# Patient Record
Sex: MALE | Race: WHITE | Employment: FULL TIME
[De-identification: names, ages, dates, MRNs, and addresses within clinical notes are randomized per-mention and may not be internally consistent; named-entity substitution may affect disease eponyms.]

---

## 2022-10-06 ENCOUNTER — OFFICE VISIT (OUTPATIENT)
Dept: INTERNAL MEDICINE CLINIC | Facility: CLINIC | Age: 22
End: 2022-10-06
Payer: COMMERCIAL

## 2022-10-06 VITALS
DIASTOLIC BLOOD PRESSURE: 67 MMHG | HEART RATE: 70 BPM | BODY MASS INDEX: 24.11 KG/M2 | OXYGEN SATURATION: 99 % | TEMPERATURE: 99.2 F | SYSTOLIC BLOOD PRESSURE: 107 MMHG | WEIGHT: 168.4 LBS | HEIGHT: 70 IN | RESPIRATION RATE: 15 BRPM

## 2022-10-06 DIAGNOSIS — M15.9 OSTEOARTHRITIS OF MULTIPLE JOINTS, UNSPECIFIED OSTEOARTHRITIS TYPE: ICD-10-CM

## 2022-10-06 DIAGNOSIS — Z76.89 ENCOUNTER TO ESTABLISH CARE: Primary | ICD-10-CM

## 2022-10-06 PROCEDURE — G8420 CALC BMI NORM PARAMETERS: HCPCS | Performed by: NURSE PRACTITIONER

## 2022-10-06 PROCEDURE — 99203 OFFICE O/P NEW LOW 30 MIN: CPT | Performed by: NURSE PRACTITIONER

## 2022-10-06 PROCEDURE — G8484 FLU IMMUNIZE NO ADMIN: HCPCS | Performed by: NURSE PRACTITIONER

## 2022-10-06 PROCEDURE — 1036F TOBACCO NON-USER: CPT | Performed by: NURSE PRACTITIONER

## 2022-10-06 PROCEDURE — G8427 DOCREV CUR MEDS BY ELIG CLIN: HCPCS | Performed by: NURSE PRACTITIONER

## 2022-10-06 ASSESSMENT — PATIENT HEALTH QUESTIONNAIRE - PHQ9
1. LITTLE INTEREST OR PLEASURE IN DOING THINGS: 0
2. FEELING DOWN, DEPRESSED OR HOPELESS: 0
SUM OF ALL RESPONSES TO PHQ QUESTIONS 1-9: 0
SUM OF ALL RESPONSES TO PHQ9 QUESTIONS 1 & 2: 0
SUM OF ALL RESPONSES TO PHQ QUESTIONS 1-9: 0

## 2022-10-06 ASSESSMENT — ANXIETY QUESTIONNAIRES
5. BEING SO RESTLESS THAT IT IS HARD TO SIT STILL: 0
7. FEELING AFRAID AS IF SOMETHING AWFUL MIGHT HAPPEN: 0
4. TROUBLE RELAXING: 0
2. NOT BEING ABLE TO STOP OR CONTROL WORRYING: 0
6. BECOMING EASILY ANNOYED OR IRRITABLE: 0
1. FEELING NERVOUS, ANXIOUS, OR ON EDGE: 0
3. WORRYING TOO MUCH ABOUT DIFFERENT THINGS: 0
GAD7 TOTAL SCORE: 0

## 2022-10-06 ASSESSMENT — ENCOUNTER SYMPTOMS
SORE THROAT: 0
NAUSEA: 0
SINUS PAIN: 0
EYE PAIN: 0
COUGH: 0
VOMITING: 0
ABDOMINAL PAIN: 0
CONSTIPATION: 0
DIARRHEA: 0
SHORTNESS OF BREATH: 0
RHINORRHEA: 0
BACK PAIN: 0

## 2022-10-06 NOTE — PROGRESS NOTES
[address]  Tel# [phone]  Fax# [phone]       Marycruz SaxenaLiang andersons, [facility]-BC  Family Nurse Practitioner            Date of Visit: 10/06/2022     Shanell Delvalle (: 2000) is a 23 y.o. male  new patient, here for evaluation of the following chief complaint(s):    Chief Complaint   Patient presents with    New Patient     Establish care         Patient Care Team:  MARINA aCntrell CNP as PCP - General (Nurse Practitioner Family)         History of Present Illness      New Patient  Presents here as a new patient and wants to establish care. Previous PCP at [facility]. Recently got his own medical insurance. Chronic Left Knee Arthritis  From tennis and other sports (started at age 8)  Had MRI of his left knee about a year ago  Per orthopedic, Dr. Adarsh Cervantes, was discussed possible surgery in 10 years ([facility])  Aggravated by running or playing sports  Has been doing more rock climbing  Takes OTC ibuprofen or topical as needed. Patient Active Problem List   Diagnosis    Osteoarthritis of multiple joints         Past Medical History:   Diagnosis Date    Elbow tendinitis     Foot fracture, right     5th metatarsal at age 13, stress fracture from cross country running    Osteoarthritis        History reviewed. No pertinent surgical history. Family History   Problem Relation Age of Onset    High Blood Pressure Father     Hearing Loss Father     Depression Sister     Cancer Maternal Grandmother     Breast Cancer Maternal Grandmother     Mental Illness Paternal Grandfather          ALLERGY  No Known Allergies        No current outpatient medications on file prior to visit. No current facility-administered medications on file prior to visit. Review of Systems  Review of Systems   Constitutional:  Negative for chills, fatigue and fever.    HENT:  Negative for congestion, postnasal drip, rhinorrhea, sinus pain, sneezing Thought Content: Thought content normal.              Assessment/Plan:          ICD-10-CM    1. Encounter to establish care  Z76.89       2. Osteoarthritis of multiple joints, unspecified osteoarthritis type  M15.9     Avoid or limit aggravating factors. May take OTC ibuprofen. Report any falls or injury. Manuela Moss was seen today for new patient. Diagnoses and all orders for this visit:    Encounter to establish care    Osteoarthritis of multiple joints, unspecified osteoarthritis type  Comments:  Avoid or limit aggravating factors. May take OTC ibuprofen. Report any falls or injury. Follow Up  Return in about 2 weeks (around 10/20/2022), or if symptoms worsen or fail to improve, for Physical with labs.              Keke Bray, BEATRIZ, FNP-BC

## 2022-10-21 ENCOUNTER — OFFICE VISIT (OUTPATIENT)
Dept: INTERNAL MEDICINE CLINIC | Facility: CLINIC | Age: 22
End: 2022-10-21
Payer: COMMERCIAL

## 2022-10-21 VITALS
HEART RATE: 57 BPM | DIASTOLIC BLOOD PRESSURE: 69 MMHG | OXYGEN SATURATION: 99 % | RESPIRATION RATE: 15 BRPM | WEIGHT: 170.2 LBS | HEIGHT: 69 IN | SYSTOLIC BLOOD PRESSURE: 116 MMHG | BODY MASS INDEX: 25.21 KG/M2 | TEMPERATURE: 98.2 F

## 2022-10-21 DIAGNOSIS — Z00.00 ROUTINE GENERAL MEDICAL EXAMINATION AT A HEALTH CARE FACILITY: Primary | ICD-10-CM

## 2022-10-21 DIAGNOSIS — J30.9 ALLERGIC RHINITIS, UNSPECIFIED SEASONALITY, UNSPECIFIED TRIGGER: ICD-10-CM

## 2022-10-21 DIAGNOSIS — Z00.00 ROUTINE GENERAL MEDICAL EXAMINATION AT A HEALTH CARE FACILITY: ICD-10-CM

## 2022-10-21 LAB
APPEARANCE UR: CLEAR
BASOPHILS # BLD: 0 K/UL (ref 0–0.2)
BASOPHILS NFR BLD: 1 % (ref 0–2)
BILIRUB UR QL: NEGATIVE
COLOR UR: NORMAL
DIFFERENTIAL METHOD BLD: NORMAL
EOSINOPHIL # BLD: 0.1 K/UL (ref 0–0.8)
EOSINOPHIL NFR BLD: 2 % (ref 0.5–7.8)
ERYTHROCYTE [DISTWIDTH] IN BLOOD BY AUTOMATED COUNT: 12.9 % (ref 11.9–14.6)
GLUCOSE UR STRIP.AUTO-MCNC: NEGATIVE MG/DL
HCT VFR BLD AUTO: 45.9 % (ref 41.1–50.3)
HGB BLD-MCNC: 14.6 G/DL (ref 13.6–17.2)
HGB UR QL STRIP: NEGATIVE
IMM GRANULOCYTES # BLD AUTO: 0 K/UL (ref 0–0.5)
IMM GRANULOCYTES NFR BLD AUTO: 0 % (ref 0–5)
KETONES UR QL STRIP.AUTO: NEGATIVE MG/DL
LEUKOCYTE ESTERASE UR QL STRIP.AUTO: NEGATIVE
LYMPHOCYTES # BLD: 1.7 K/UL (ref 0.5–4.6)
LYMPHOCYTES NFR BLD: 34 % (ref 13–44)
MCH RBC QN AUTO: 29.3 PG (ref 26.1–32.9)
MCHC RBC AUTO-ENTMCNC: 31.8 G/DL (ref 31.4–35)
MCV RBC AUTO: 92 FL (ref 82–102)
MONOCYTES # BLD: 0.4 K/UL (ref 0.1–1.3)
MONOCYTES NFR BLD: 7 % (ref 4–12)
NEUTS SEG # BLD: 2.8 K/UL (ref 1.7–8.2)
NEUTS SEG NFR BLD: 56 % (ref 43–78)
NITRITE UR QL STRIP.AUTO: NEGATIVE
NRBC # BLD: 0 K/UL (ref 0–0.2)
PH UR STRIP: 5.5 [PH] (ref 5–9)
PLATELET # BLD AUTO: 196 K/UL (ref 150–450)
PMV BLD AUTO: 11.6 FL (ref 9.4–12.3)
PROT UR STRIP-MCNC: NEGATIVE MG/DL
RBC # BLD AUTO: 4.99 M/UL (ref 4.23–5.6)
SP GR UR REFRACTOMETRY: 1.02 (ref 1–1.02)
UROBILINOGEN UR QL STRIP.AUTO: 0.2 EU/DL (ref 0.2–1)
WBC # BLD AUTO: 5 K/UL (ref 4.3–11.1)

## 2022-10-21 PROCEDURE — 99395 PREV VISIT EST AGE 18-39: CPT | Performed by: NURSE PRACTITIONER

## 2022-10-21 PROCEDURE — G8484 FLU IMMUNIZE NO ADMIN: HCPCS | Performed by: NURSE PRACTITIONER

## 2022-10-21 RX ORDER — FLUTICASONE PROPIONATE 50 MCG
1 SPRAY, SUSPENSION (ML) NASAL DAILY PRN
Qty: 16 G | Refills: 2 | Status: SHIPPED | OUTPATIENT
Start: 2022-10-21

## 2022-10-21 ASSESSMENT — ANXIETY QUESTIONNAIRES
7. FEELING AFRAID AS IF SOMETHING AWFUL MIGHT HAPPEN: 0
6. BECOMING EASILY ANNOYED OR IRRITABLE: 0
5. BEING SO RESTLESS THAT IT IS HARD TO SIT STILL: 0
IF YOU CHECKED OFF ANY PROBLEMS ON THIS QUESTIONNAIRE, HOW DIFFICULT HAVE THESE PROBLEMS MADE IT FOR YOU TO DO YOUR WORK, TAKE CARE OF THINGS AT HOME, OR GET ALONG WITH OTHER PEOPLE: NOT DIFFICULT AT ALL
2. NOT BEING ABLE TO STOP OR CONTROL WORRYING: 0
GAD7 TOTAL SCORE: 0
1. FEELING NERVOUS, ANXIOUS, OR ON EDGE: 0
4. TROUBLE RELAXING: 0
3. WORRYING TOO MUCH ABOUT DIFFERENT THINGS: 0

## 2022-10-21 ASSESSMENT — ENCOUNTER SYMPTOMS
CONSTIPATION: 0
SORE THROAT: 0
RHINORRHEA: 0
SHORTNESS OF BREATH: 0
NAUSEA: 0
SINUS PAIN: 0
COUGH: 0
VOMITING: 0
EYE PAIN: 0
BACK PAIN: 0
ABDOMINAL PAIN: 0
DIARRHEA: 0

## 2022-10-21 ASSESSMENT — PATIENT HEALTH QUESTIONNAIRE - PHQ9
2. FEELING DOWN, DEPRESSED OR HOPELESS: 0
SUM OF ALL RESPONSES TO PHQ9 QUESTIONS 1 & 2: 0
SUM OF ALL RESPONSES TO PHQ QUESTIONS 1-9: 0
1. LITTLE INTEREST OR PLEASURE IN DOING THINGS: 0
SUM OF ALL RESPONSES TO PHQ QUESTIONS 1-9: 0

## 2022-10-21 NOTE — PROGRESS NOTES
[facility]  [place] [address]  Tel# [phone]  Fax# [phone]       Rita Stevenson, FN-BC  Family Nurse Practitioner            Date of Visit: 10/21/2022     Mili Hendrickson (: 2000) is a 23 y.o. male  established patient, here for evaluation of the following chief complaint(s):    Chief Complaint   Patient presents with    Annual Exam     physical         Patient Care Team:  MARINA Norris CNP as PCP - General (Nurse Practitioner Family)  MARINA Norris CNP as PCP - [facility] Empaneled Provider         History of Present Illness          Physical   Presents here for physical with labs. Denies any new complaints at this time. HCM    Influenza vaccine  season: not yet    Immunization History   Administered Date(s) Administered    COVID-19, MODERNA BLUE border, Primary or Immunocompromised, (age 12y+), IM, 100 mcg/0.5mL 2021    COVID-19, MODERNA Booster BLUE border, (age 18y+), IM, 50mcg/0.25mL 2021, 2021    Meningococcal MCV4P (Menactra) 2017    Varicella (Varivax) 2016              Patient Active Problem List   Diagnosis    Osteoarthritis of multiple joints       Past Medical History:   Diagnosis Date    Elbow tendinitis     Foot fracture, right     5th metatarsal at age 13, stress fracture from cross country running    Osteoarthritis        History reviewed. No pertinent surgical history. Family History   Problem Relation Age of Onset    High Blood Pressure Father     Hearing Loss Father     Depression Sister     Cancer Maternal Grandmother     Breast Cancer Maternal Grandmother     Mental Illness Paternal Grandfather          ALLERGY  No Known Allergies        No current outpatient medications on file prior to visit. No current facility-administered medications on file prior to visit. Review of Systems  Review of Systems   Constitutional:  Negative for chills, fatigue and fever.    HENT:  Negative for congestion, postnasal drip, rhinorrhea, sinus pain, sneezing and sore throat. Eyes:  Negative for pain and visual disturbance. Respiratory:  Negative for cough and shortness of breath. Cardiovascular:  Negative for chest pain, palpitations and leg swelling. Gastrointestinal:  Negative for abdominal pain, constipation, diarrhea, nausea and vomiting. Genitourinary:  Negative for dysuria, frequency and urgency. Musculoskeletal:  Negative for back pain, gait problem and joint swelling. Skin:  Negative for rash and wound. Neurological:  Negative for dizziness and headaches. Psychiatric/Behavioral:  Negative for behavioral problems, sleep disturbance and suicidal ideas. The patient is not nervous/anxious. Vitals:    10/21/22 0817   BP: 116/69   Site: Left Upper Arm   Position: Sitting   Cuff Size: Medium Adult   Pulse: 57   Resp: 15   Temp: 98.2 °F (36.8 °C)   TempSrc: Temporal   SpO2: 99%   Weight: 170 lb 3.2 oz (77.2 kg)   Height: 5' 9.29\" (1.76 m)              Physical Exam  Physical Exam  Constitutional:       General: He is not in acute distress. Appearance: He is not ill-appearing. HENT:      Head: Normocephalic and atraumatic. Right Ear: Tympanic membrane normal.      Left Ear: Tympanic membrane normal.      Nose:      Comments: Nasal turbinates pale, edematous     Mouth/Throat:      Mouth: Mucous membranes are moist.   Eyes:      Pupils: Pupils are equal, round, and reactive to light. Cardiovascular:      Rate and Rhythm: Normal rate and regular rhythm. Pulmonary:      Effort: Pulmonary effort is normal. No respiratory distress. Breath sounds: Normal breath sounds. Abdominal:      General: Abdomen is flat. Bowel sounds are normal.      Palpations: Abdomen is soft. Tenderness: There is no abdominal tenderness. There is no guarding. Musculoskeletal:         General: Normal range of motion. Cervical back: Neck supple.    Skin:     General: Skin is warm and dry. Neurological:      General: No focal deficit present. Mental Status: He is alert and oriented to person, place, and time. Psychiatric:         Mood and Affect: Mood normal.         Thought Content: Thought content normal.              Assessment/Plan:          ICD-10-CM    1. Routine general medical examination at a health care facility  Z00.00 CBC with Auto Differential     Comprehensive Metabolic Panel     Lipid Panel     TSH     Urinalysis      2. Allergic rhinitis, unspecified seasonality, unspecified trigger  J30.9 fluticasone (FLONASE) 50 MCG/ACT nasal spray    Advised on hand and respiratory hygiene. 3. Body mass index (BMI) of 24.0-24.9 in adult  Z68.24                Aneita Bosworth was seen today for annual exam.    Diagnoses and all orders for this visit:    Routine general medical examination at a health care facility  -     CBC with Auto Differential; Future  -     Comprehensive Metabolic Panel; Future  -     Lipid Panel; Future  -     TSH; Future  -     Urinalysis; Future    Allergic rhinitis, unspecified seasonality, unspecified trigger  Comments:  Advised on hand and respiratory hygiene.   Orders:  -     fluticasone (FLONASE) 50 MCG/ACT nasal spray; 1 spray by Each Nostril route daily as needed for Rhinitis or Allergies    Body mass index (BMI) of 24.0-24.9 in adult               Orders Placed This Encounter   Procedures    CBC with Auto Differential     Standing Status:   Future     Standing Expiration Date:   10/21/2023    Comprehensive Metabolic Panel     Standing Status:   Future     Standing Expiration Date:   1/21/2023    Lipid Panel     Standing Status:   Future     Standing Expiration Date:   1/21/2023     Order Specific Question:   Is Patient Fasting?/# of Hours     Answer:   Yes    TSH     Standing Status:   Future     Standing Expiration Date:   1/21/2023    Urinalysis     Standing Status:   Future     Standing Expiration Date:   10/21/2023                    Follow Up  Return in about 1 year (around 10/21/2023), or if symptoms worsen or fail to improve, for Physical with labs.              Etelvina Schwartz, BEATRIZ, FNP-BC

## 2022-10-23 LAB
ALBUMIN SERPL-MCNC: 4.3 G/DL (ref 3.5–5)
ALBUMIN/GLOB SERPL: 1.4 {RATIO} (ref 0.4–1.6)
ALP SERPL-CCNC: 55 U/L (ref 50–136)
ALT SERPL-CCNC: 35 U/L (ref 12–65)
ANION GAP SERPL CALC-SCNC: 4 MMOL/L (ref 2–11)
AST SERPL-CCNC: 47 U/L (ref 15–37)
BILIRUB SERPL-MCNC: 0.4 MG/DL (ref 0.2–1.1)
BUN SERPL-MCNC: 12 MG/DL (ref 6–23)
CALCIUM SERPL-MCNC: 8.9 MG/DL (ref 8.3–10.4)
CHLORIDE SERPL-SCNC: 105 MMOL/L (ref 101–110)
CHOLEST SERPL-MCNC: 175 MG/DL
CO2 SERPL-SCNC: 29 MMOL/L (ref 21–32)
CREAT SERPL-MCNC: 0.9 MG/DL (ref 0.8–1.5)
GLOBULIN SER CALC-MCNC: 3 G/DL (ref 2.8–4.5)
GLUCOSE SERPL-MCNC: 80 MG/DL (ref 65–100)
HDLC SERPL-MCNC: 47 MG/DL (ref 40–60)
HDLC SERPL: 3.7 {RATIO}
LDLC SERPL CALC-MCNC: 110.6 MG/DL
POTASSIUM SERPL-SCNC: 4.1 MMOL/L (ref 3.5–5.1)
PROT SERPL-MCNC: 7.3 G/DL (ref 6.3–8.2)
SODIUM SERPL-SCNC: 138 MMOL/L (ref 133–143)
TRIGL SERPL-MCNC: 87 MG/DL (ref 35–150)
TSH, 3RD GENERATION: 1.51 UIU/ML (ref 0.36–3.74)
VLDLC SERPL CALC-MCNC: 17.4 MG/DL (ref 6–23)

## 2023-01-17 ENCOUNTER — OFFICE VISIT (OUTPATIENT)
Dept: INTERNAL MEDICINE CLINIC | Facility: CLINIC | Age: 23
End: 2023-01-17
Payer: COMMERCIAL

## 2023-01-17 VITALS
DIASTOLIC BLOOD PRESSURE: 65 MMHG | BODY MASS INDEX: 25.03 KG/M2 | OXYGEN SATURATION: 97 % | HEIGHT: 69 IN | TEMPERATURE: 98.5 F | SYSTOLIC BLOOD PRESSURE: 123 MMHG | HEART RATE: 67 BPM | WEIGHT: 169 LBS

## 2023-01-17 DIAGNOSIS — R74.01 ELEVATED AST (SGOT): ICD-10-CM

## 2023-01-17 DIAGNOSIS — J31.0 CHRONIC RHINITIS: ICD-10-CM

## 2023-01-17 DIAGNOSIS — E78.00 PURE HYPERCHOLESTEROLEMIA: ICD-10-CM

## 2023-01-17 DIAGNOSIS — E78.00 PURE HYPERCHOLESTEROLEMIA: Primary | ICD-10-CM

## 2023-01-17 DIAGNOSIS — J34.2 DEVIATED SEPTUM: ICD-10-CM

## 2023-01-17 LAB
BASOPHILS # BLD: 0 K/UL (ref 0–0.2)
BASOPHILS NFR BLD: 1 % (ref 0–2)
DIFFERENTIAL METHOD BLD: NORMAL
EOSINOPHIL # BLD: 0.1 K/UL (ref 0–0.8)
EOSINOPHIL NFR BLD: 2 % (ref 0.5–7.8)
ERYTHROCYTE [DISTWIDTH] IN BLOOD BY AUTOMATED COUNT: 12.5 % (ref 11.9–14.6)
HCT VFR BLD AUTO: 43.8 % (ref 41.1–50.3)
HGB BLD-MCNC: 14.5 G/DL (ref 13.6–17.2)
IMM GRANULOCYTES # BLD AUTO: 0 K/UL (ref 0–0.5)
IMM GRANULOCYTES NFR BLD AUTO: 0 % (ref 0–5)
LYMPHOCYTES # BLD: 2.1 K/UL (ref 0.5–4.6)
LYMPHOCYTES NFR BLD: 35 % (ref 13–44)
MCH RBC QN AUTO: 29.2 PG (ref 26.1–32.9)
MCHC RBC AUTO-ENTMCNC: 33.1 G/DL (ref 31.4–35)
MCV RBC AUTO: 88.3 FL (ref 82–102)
MONOCYTES # BLD: 0.4 K/UL (ref 0.1–1.3)
MONOCYTES NFR BLD: 7 % (ref 4–12)
NEUTS SEG # BLD: 3.4 K/UL (ref 1.7–8.2)
NEUTS SEG NFR BLD: 55 % (ref 43–78)
NRBC # BLD: 0 K/UL (ref 0–0.2)
PLATELET # BLD AUTO: 228 K/UL (ref 150–450)
PMV BLD AUTO: 10.6 FL (ref 9.4–12.3)
RBC # BLD AUTO: 4.96 M/UL (ref 4.23–5.6)
WBC # BLD AUTO: 6.1 K/UL (ref 4.3–11.1)

## 2023-01-17 PROCEDURE — 1036F TOBACCO NON-USER: CPT | Performed by: NURSE PRACTITIONER

## 2023-01-17 PROCEDURE — 99214 OFFICE O/P EST MOD 30 MIN: CPT | Performed by: NURSE PRACTITIONER

## 2023-01-17 PROCEDURE — G8420 CALC BMI NORM PARAMETERS: HCPCS | Performed by: NURSE PRACTITIONER

## 2023-01-17 PROCEDURE — G8484 FLU IMMUNIZE NO ADMIN: HCPCS | Performed by: NURSE PRACTITIONER

## 2023-01-17 PROCEDURE — G8427 DOCREV CUR MEDS BY ELIG CLIN: HCPCS | Performed by: NURSE PRACTITIONER

## 2023-01-17 RX ORDER — MONTELUKAST SODIUM 10 MG/1
10 TABLET ORAL DAILY
Qty: 90 TABLET | Refills: 1 | Status: SHIPPED | OUTPATIENT
Start: 2023-01-17

## 2023-01-17 ASSESSMENT — PATIENT HEALTH QUESTIONNAIRE - PHQ9
SUM OF ALL RESPONSES TO PHQ QUESTIONS 1-9: 0
SUM OF ALL RESPONSES TO PHQ QUESTIONS 1-9: 0
2. FEELING DOWN, DEPRESSED OR HOPELESS: 0
SUM OF ALL RESPONSES TO PHQ9 QUESTIONS 1 & 2: 0
SUM OF ALL RESPONSES TO PHQ QUESTIONS 1-9: 0
SUM OF ALL RESPONSES TO PHQ QUESTIONS 1-9: 0
1. LITTLE INTEREST OR PLEASURE IN DOING THINGS: 0

## 2023-01-17 ASSESSMENT — ANXIETY QUESTIONNAIRES
1. FEELING NERVOUS, ANXIOUS, OR ON EDGE: 0
5. BEING SO RESTLESS THAT IT IS HARD TO SIT STILL: 0
4. TROUBLE RELAXING: 0
IF YOU CHECKED OFF ANY PROBLEMS ON THIS QUESTIONNAIRE, HOW DIFFICULT HAVE THESE PROBLEMS MADE IT FOR YOU TO DO YOUR WORK, TAKE CARE OF THINGS AT HOME, OR GET ALONG WITH OTHER PEOPLE: NOT DIFFICULT AT ALL
2. NOT BEING ABLE TO STOP OR CONTROL WORRYING: 0
6. BECOMING EASILY ANNOYED OR IRRITABLE: 0
GAD7 TOTAL SCORE: 0
3. WORRYING TOO MUCH ABOUT DIFFERENT THINGS: 0
7. FEELING AFRAID AS IF SOMETHING AWFUL MIGHT HAPPEN: 0

## 2023-01-17 ASSESSMENT — ENCOUNTER SYMPTOMS
COUGH: 0
VOMITING: 0
DIARRHEA: 0
CONSTIPATION: 0
BACK PAIN: 0
EYE PAIN: 0
RHINORRHEA: 1
SHORTNESS OF BREATH: 0
SORE THROAT: 0
NAUSEA: 0
SINUS PAIN: 0
ABDOMINAL PAIN: 0

## 2023-01-17 NOTE — PROGRESS NOTES
[facility]  [address] [place] [address]  Tel# [phone]  Fax# [phone]     Liang Bergeronshailesh, [facility]-BC  Family Nurse Practitioner            Date of Visit: 2023     Jose Jones (: 2000) is a 23 y.o. male established patient, here for evaluation of the following chief complaint(s):    Chief Complaint   Patient presents with    Follow-up     Patient states that he is follow up on his last visit. Patient states that he was Covid + last week , but is fine now          Patient Care Team:  MARINA San CNP as PCP - General (Nurse Practitioner Family)  MARINA San CNP as PCP - [facility] Provider         History of Present Illness      Presents here for 3 mo fu and for repeat labs. Hyperlipidemia    Lab Results   Component Value Date    CHOL 175 10/21/2022     Lab Results   Component Value Date    TRIG 87 10/21/2022     Lab Results   Component Value Date    HDL 47 10/21/2022     Lab Results   Component Value Date    LDLCALC 110.6 (H) 10/21/2022     Lab Results   Component Value Date    LABVLDL 17.4 10/21/2022     Lab Results   Component Value Date    CHOLHDLRATIO 3.7 10/21/2022            Elevated AST  Pt denies any frequent use of acetaminophen or frequent alcohol use. Denies any prescription medications. Lab Results   Component Value Date     10/21/2022    K 4.1 10/21/2022     10/21/2022    CO2 29 10/21/2022    BUN 12 10/21/2022    CREATININE 0.90 10/21/2022    GLUCOSE 80 10/21/2022    CALCIUM 8.9 10/21/2022    PROT 7.3 10/21/2022    LABALBU 4.3 10/21/2022    BILITOT 0.4 10/21/2022    ALKPHOS 55 10/21/2022    AST 47 (H) 10/21/2022    ALT 35 10/21/2022    LABGLOM >60 10/21/2022    GLOB 3.0 10/21/2022            Hx of Covid 19  Pt states he had flu and cold like symptoms (stuffy nose, headache, body aches), home test for Covid 19 resulted positive. Symptoms lasted for about 2 days.  States 3 days prior, he was in the airport, flight got canceled. Chronic Sinusitis  Pt states he gets frequent runny nose and stuffy nose throughout the year. States his father and sister both have frequent allergies. Has a dog at home. Tried Flonase, didn't help. HCM    Immunization History   Administered Date(s) Administered    COVID-19, MODERNA BLUE border, Primary or Immunocompromised, (age 12y+), IM, 100 mcg/0.5mL 03/04/2021    COVID-19, MODERNA Booster BLUE border, (age 18y+), IM, 50mcg/0.25mL 04/01/2021, 12/31/2021    Meningococcal MCV4P (Menactra) 08/16/2017    Varicella (Varivax) 06/03/2016            Patient Active Problem List   Diagnosis    Osteoarthritis of multiple joints    Pure hypercholesterolemia    Elevated AST (SGOT)       Past Medical History:   Diagnosis Date    Elbow tendinitis     Foot fracture, right     5th metatarsal at age 13, stress fracture from cross country running    Osteoarthritis        History reviewed. No pertinent surgical history. Family History   Problem Relation Age of Onset    High Blood Pressure Father     Hearing Loss Father     Depression Sister     Cancer Maternal Grandmother     Breast Cancer Maternal Grandmother     Mental Illness Paternal Grandfather          ALLERGY  No Known Allergies        Current Outpatient Medications on File Prior to Visit   Medication Sig Dispense Refill    fluticasone (FLONASE) 50 MCG/ACT nasal spray 1 spray by Each Nostril route daily as needed for Rhinitis or Allergies (Patient not taking: Reported on 1/17/2023) 16 g 2     No current facility-administered medications on file prior to visit. Review of Systems  Review of Systems   Constitutional:  Negative for chills, fatigue and fever. HENT:  Positive for rhinorrhea (chronic). Negative for congestion, postnasal drip, sinus pain, sneezing and sore throat. Eyes:  Negative for pain and visual disturbance. Respiratory:  Negative for cough and shortness of breath.     Cardiovascular:  Negative for chest pain, palpitations and leg swelling. Gastrointestinal:  Negative for abdominal pain, constipation, diarrhea, nausea and vomiting. Genitourinary:  Negative for dysuria, frequency and urgency. Musculoskeletal:  Negative for back pain, gait problem and joint swelling. Skin:  Negative for rash and wound. Neurological:  Negative for dizziness and headaches. Psychiatric/Behavioral:  Negative for behavioral problems, sleep disturbance and suicidal ideas. The patient is not nervous/anxious. Vitals:    01/17/23 0756   BP: 123/65   Pulse: 67   Temp: 98.5 °F (36.9 °C)   TempSrc: Temporal   SpO2: 97%   Weight: 169 lb (76.7 kg)   Height: 5' 9\" (1.753 m)              Physical Exam  Physical Exam  Constitutional:       General: He is not in acute distress. Appearance: He is not ill-appearing. HENT:      Head: Normocephalic and atraumatic. Right Ear: Tympanic membrane normal.      Left Ear: Tympanic membrane normal.      Nose: No congestion. Comments: Positive for right deviated septum     Mouth/Throat:      Mouth: Mucous membranes are moist.   Eyes:      Pupils: Pupils are equal, round, and reactive to light. Cardiovascular:      Rate and Rhythm: Normal rate and regular rhythm. Pulmonary:      Effort: Pulmonary effort is normal. No respiratory distress. Breath sounds: Normal breath sounds. Abdominal:      General: Bowel sounds are normal.      Palpations: Abdomen is soft. Musculoskeletal:         General: Normal range of motion. Cervical back: Neck supple. Skin:     General: Skin is warm and dry. Neurological:      General: No focal deficit present. Mental Status: He is alert and oriented to person, place, and time. Psychiatric:         Mood and Affect: Mood normal.         Thought Content: Thought content normal.              Assessment/Plan:          ICD-10-CM    1. Pure hypercholesterolemia  E78.00 Lipid Panel      2.  Elevated AST (SGOT)  R74.01 Comprehensive Metabolic Panel      3. [facility]  [facility]2 [facility]      4. Chronic rhinitis  J31.0 OSBALDO Steward MD, All[facility], [place]     montelukast (SINGULAIR) 10 MG tablet     CBC with Auto Differential               Raya Steen was seen today for follow-up. Diagnoses and all orders for this visit:    Pure hypercholesterolemia  -     Lipid Panel; Future    Elevated AST (SGOT)  -     Comprehensive Metabolic Panel; Future    Deviated septum  -     [facility] [place]    Chronic rhinitis  -     OSBALDO Steward MD, All[facility], [place]  -     montelukast (SINGULAIR) 10 MG tablet; Take 1 tablet by mouth daily  -     CBC with Auto Differential; Future       Advised on low fat, low cholesterol diet. Advised on nutrient dense, whole foods. Advised to avoid or limit fast foods, processed foods. Advised on cardiovascular risks and complications prevention.               Orders Placed This Encounter   Procedures    Lipid Panel     Standing Status:   Future     Standing Expiration Date:   4/17/2023     Order Specific Question:   Is Patient Fasting?/# of Hours     Answer:   Yes    Comprehensive Metabolic Panel     Standing Status:   Future     Standing Expiration Date:   4/17/2023    CBC with Auto Differential     Standing Status:   Future     Standing Expiration Date:   4/17/2023 [facility]     Referral Priority:   Routine     Referral Type:   Eval and Treat     Referral Reason:   Specialty Services Required     Requested Specialty:   Otolaryngology     Number of Visits Requested:   1    [facility] Steward [facility]     Referral Priority:   Routine     Referral Type:   Eval and Treat     Referral Reason:   Specialty Services Required     Referred to Provider:   Travis Guajardo MD     Requested Specialty:   Allergy and Immunology     Number of Visits Requested:   1                  Follow Up  Return in about 9 months (around 10/23/2023), or if symptoms worsen or fail to improve, for Physical with fasting labs.              Juan Dwyer, BEATRIZ, FNP-BC

## 2023-01-18 LAB
ALBUMIN SERPL-MCNC: 3.9 G/DL (ref 3.5–5)
ALBUMIN/GLOB SERPL: 1.1 (ref 0.4–1.6)
ALP SERPL-CCNC: 66 U/L (ref 50–136)
ALT SERPL-CCNC: 24 U/L (ref 12–65)
ANION GAP SERPL CALC-SCNC: 6 MMOL/L (ref 2–11)
AST SERPL-CCNC: 15 U/L (ref 15–37)
BILIRUB SERPL-MCNC: 0.4 MG/DL (ref 0.2–1.1)
BUN SERPL-MCNC: 13 MG/DL (ref 6–23)
CALCIUM SERPL-MCNC: 9.1 MG/DL (ref 8.3–10.4)
CHLORIDE SERPL-SCNC: 108 MMOL/L (ref 101–110)
CHOLEST SERPL-MCNC: 210 MG/DL
CO2 SERPL-SCNC: 27 MMOL/L (ref 21–32)
CREAT SERPL-MCNC: 0.9 MG/DL (ref 0.8–1.5)
GLOBULIN SER CALC-MCNC: 3.4 G/DL (ref 2.8–4.5)
GLUCOSE SERPL-MCNC: 93 MG/DL (ref 65–100)
HDLC SERPL-MCNC: 54 MG/DL (ref 40–60)
HDLC SERPL: 3.9
LDLC SERPL CALC-MCNC: 136.8 MG/DL
POTASSIUM SERPL-SCNC: 4.3 MMOL/L (ref 3.5–5.1)
PROT SERPL-MCNC: 7.3 G/DL (ref 6.3–8.2)
SODIUM SERPL-SCNC: 141 MMOL/L (ref 133–143)
TRIGL SERPL-MCNC: 96 MG/DL (ref 35–150)
VLDLC SERPL CALC-MCNC: 19.2 MG/DL (ref 6–23)

## 2023-02-01 ENCOUNTER — OFFICE VISIT (OUTPATIENT)
Dept: ENT CLINIC | Age: 23
End: 2023-02-01
Payer: COMMERCIAL

## 2023-02-01 VITALS
BODY MASS INDEX: 25.83 KG/M2 | SYSTOLIC BLOOD PRESSURE: 110 MMHG | DIASTOLIC BLOOD PRESSURE: 70 MMHG | WEIGHT: 174.4 LBS | HEIGHT: 69 IN

## 2023-02-01 DIAGNOSIS — J34.89 NASAL OBSTRUCTION: ICD-10-CM

## 2023-02-01 DIAGNOSIS — J30.9 ALLERGIC RHINITIS, UNSPECIFIED SEASONALITY, UNSPECIFIED TRIGGER: Primary | ICD-10-CM

## 2023-02-01 DIAGNOSIS — J34.2 NASAL SEPTAL DEVIATION: ICD-10-CM

## 2023-02-01 DIAGNOSIS — J34.3 HYPERTROPHY OF BOTH INFERIOR NASAL TURBINATES: ICD-10-CM

## 2023-02-01 PROCEDURE — G8427 DOCREV CUR MEDS BY ELIG CLIN: HCPCS | Performed by: STUDENT IN AN ORGANIZED HEALTH CARE EDUCATION/TRAINING PROGRAM

## 2023-02-01 PROCEDURE — G8419 CALC BMI OUT NRM PARAM NOF/U: HCPCS | Performed by: STUDENT IN AN ORGANIZED HEALTH CARE EDUCATION/TRAINING PROGRAM

## 2023-02-01 PROCEDURE — 99204 OFFICE O/P NEW MOD 45 MIN: CPT | Performed by: STUDENT IN AN ORGANIZED HEALTH CARE EDUCATION/TRAINING PROGRAM

## 2023-02-01 PROCEDURE — 1036F TOBACCO NON-USER: CPT | Performed by: STUDENT IN AN ORGANIZED HEALTH CARE EDUCATION/TRAINING PROGRAM

## 2023-02-01 PROCEDURE — 31231 NASAL ENDOSCOPY DX: CPT | Performed by: STUDENT IN AN ORGANIZED HEALTH CARE EDUCATION/TRAINING PROGRAM

## 2023-02-01 PROCEDURE — G8484 FLU IMMUNIZE NO ADMIN: HCPCS | Performed by: STUDENT IN AN ORGANIZED HEALTH CARE EDUCATION/TRAINING PROGRAM

## 2023-02-01 ASSESSMENT — ENCOUNTER SYMPTOMS
EYE DISCHARGE: 0
COUGH: 0
ABDOMINAL PAIN: 0

## 2023-02-01 NOTE — PROGRESS NOTES
[facility] Office Note    Patient: Yony Mcdaniels  MRN: [id number]  : 2000  Gender:  male  Vital Signs: /70 (Site: Left Upper Arm, Position: Sitting)   Ht 5' 9\" (1.753 m)   Wt 174 lb 6.4 oz (79.1 kg)   BMI 25.75 kg/m²   Date: 2023    CC:   Chief Complaint   Patient presents with    New Patient     Patient here for deviated septum . HPI:  Yony Mcdaniels is a 23 y.o. male who endorses nasal obstruction for about 10 years that started after nasal trauma. He has more trouble breathing out of his left side. He has tried intranasal steroids without significant improvement. He denies frequent sinus infections. Past Medical History, Past Surgical History, Family history, Social History, and Medications were all reviewed with the patient today and updated as necessary. No Known Allergies  Patient Active Problem List   Diagnosis    Osteoarthritis of multiple joints    Pure hypercholesterolemia    Elevated AST (SGOT)    Deviated septum    Chronic rhinitis     Current Outpatient Medications   Medication Sig    montelukast (SINGULAIR) 10 MG tablet Take 1 tablet by mouth daily    fluticasone (FLONASE) 50 MCG/ACT nasal spray 1 spray by Each Nostril route daily as needed for Rhinitis or Allergies (Patient not taking: Reported on 2023)     No current facility-administered medications for this visit. Past Medical History:   Diagnosis Date    Elbow tendinitis     Foot fracture, right     5th metatarsal at age 13, stress fracture from cross country running    Osteoarthritis      Social History     Tobacco Use    Smoking status: Never    Smokeless tobacco: Never   Substance Use Topics    Alcohol use: Never     History reviewed. No pertinent surgical history.   Family History   Problem Relation Age of Onset    High Blood Pressure Father     Hearing Loss Father     Depression Sister     Cancer Maternal Grandmother     Breast Cancer Maternal Grandmother     Mental Illness Paternal Grandfather ROS:    Review of Systems   Constitutional:  Negative for fever. HENT:  Negative for ear discharge and ear pain. Eyes:  Negative for discharge. Respiratory:  Negative for cough. Cardiovascular:  Negative for chest pain. Gastrointestinal:  Negative for abdominal pain. Genitourinary:  Negative for difficulty urinating. Musculoskeletal:  Negative for neck pain. Skin:  Negative for rash. Allergic/Immunologic: Negative for environmental allergies. Neurological:  Negative for dizziness. Hematological:  Negative for adenopathy. Psychiatric/Behavioral:  Negative for agitation. PHYSICAL EXAM:    /70 (Site: Left Upper Arm, Position: Sitting)   Ht 5' 9\" (1.753 m)   Wt 174 lb 6.4 oz (79.1 kg)   BMI 25.75 kg/m²     General: NAD, well-appearing  Neuro: No gross neuro deficits. CN's II-XII intact. No facial weakness. Eyes: EOMI. Pupils reactive. No periorbital edema/ecchymosis. Skin: No facial erythema, rashes or concerning lesions. Nose: No external deviations or saddling. Intranasally, septum is deviated to the left, bilateral inferior turbinate hypertrophy. Mouth: Moist mucus membranes, normal tongue/palate mobility, no concerning mucosal lesions. Oropharynx: clear with no erythema/exudate, no tonsillar hypertrophy. Ears: Normal appearing auricles, no hematomas. EACs clear with no cerumen impaction, healthy canal skin, TM's intact with no perforations or retraction pockets. No middle ear effusions. Neck: Soft, supple, no palpable lateral neck masses. No parotid or submandibular masses. No thyromegaly or palpable thyroid nodules. No surgical scars. Lymphatics: No palpable cervical LAD. Resp/Lungs: No audible stridor or wheezing, CTAB  Heart: RRR  Extremities: No clubbing or cyanosis.     PROCEDURE: Nasal endoscopy  INDICATIONS: nasal obstruction  DESCRIPTION: After verbal consent was obtained and a timeout was performed, the 0 degree rigid nasal endoscope was advanced into both nares. The septum was deviated to the left w/ no perforations. Bilateral inferior turbinate hypertrophy. There were no masses seen along the nasal floor or within the nasopharynx. On the R side, the mucosa was healthy and the turbinates were intact. The middle meatus was clear w/ no edema, polyps or mucopurulence. On the L side, the mucosa was healthy and the turbinates were intact. The middle meatus was clear w/ no edema, polyps or mucopurulence. The sphenoethmoidal recess was examined bilaterally and was free of pus or polyposis. The scope was then removed and the patient tolerated the procedure well w/ no complications. Lab Results   Component Value Date    WBC 6.1 01/17/2023    HGB 14.5 01/17/2023    HCT 43.8 01/17/2023    MCV 88.3 01/17/2023     01/17/2023      Lab Results   Component Value Date/Time     01/17/2023 08:35 AM    K 4.3 01/17/2023 08:35 AM     01/17/2023 08:35 AM    CO2 27 01/17/2023 08:35 AM    BUN 13 01/17/2023 08:35 AM    CREATININE 0.90 01/17/2023 08:35 AM    GLUCOSE 93 01/17/2023 08:35 AM    CALCIUM 9.1 01/17/2023 08:35 AM        ASSESSMENT and PLAN  26-year-old male with nasal obstruction. He has left-sided nasal septal deviation and bilateral inferior turbinate hypertrophy. He has persistent nasal obstruction despite use of intranasal steroids in the past.  I recommended septoplasty and turbinate reduction. Risk include bleeding, infection, scarring, cosmetic changes to the nose, and temporary dental numbness. He understands and agrees to proceed. ICD-10-CM    1. Allergic rhinitis, unspecified seasonality, unspecified trigger  J30.9       2. Nasal obstruction  J34.89 NASAL ENDOSCOPY,DX      3. Nasal septal deviation  J34.2       4. Hypertrophy of both inferior nasal turbinates  J34.3             Yulisa Ugalde MD  2/1/2023  Electronically signed    Note dictated using voice recognition software. Please excuse any typos.

## 2023-02-07 ENCOUNTER — TELEPHONE (OUTPATIENT)
Dept: ENT CLINIC | Age: 23
End: 2023-02-07

## 2023-02-07 NOTE — TELEPHONE ENCOUNTER
Received msg from patient that he needs to reschedule surgery . I called and lvm with possible dates.

## 2023-03-24 ENCOUNTER — PREP FOR PROCEDURE (OUTPATIENT)
Dept: ENT CLINIC | Age: 23
End: 2023-03-24

## 2023-05-18 DIAGNOSIS — G89.18 POST-OP PAIN: Primary | ICD-10-CM

## 2023-05-18 RX ORDER — HYDROCODONE BITARTRATE AND ACETAMINOPHEN 5; 325 MG/1; MG/1
1 TABLET ORAL EVERY 4 HOURS PRN
Qty: 30 TABLET | Refills: 0 | Status: SHIPPED | OUTPATIENT
Start: 2023-05-18 | End: 2023-05-23

## 2023-05-18 RX ORDER — ONDANSETRON 4 MG/1
4 TABLET, ORALLY DISINTEGRATING ORAL 3 TIMES DAILY PRN
Qty: 10 TABLET | Refills: 0 | Status: SHIPPED | OUTPATIENT
Start: 2023-05-18

## 2023-05-18 RX ORDER — CEPHALEXIN 500 MG/1
500 CAPSULE ORAL 4 TIMES DAILY
Qty: 28 CAPSULE | Refills: 0 | Status: SHIPPED | OUTPATIENT
Start: 2023-05-18

## 2023-05-31 ENCOUNTER — OFFICE VISIT (OUTPATIENT)
Dept: ENT CLINIC | Age: 23
End: 2023-05-31

## 2023-05-31 DIAGNOSIS — Z98.890 S/P NASAL SEPTOPLASTY: Primary | ICD-10-CM

## 2023-05-31 PROCEDURE — 99024 POSTOP FOLLOW-UP VISIT: CPT | Performed by: OTOLARYNGOLOGY

## 2023-05-31 NOTE — PROGRESS NOTES
Cass Garland is a 23 y.o. male seen today now 1 wk post-op after undergoing septoplasty back on 5/25/2023. Overall doing well but ready for stents to come out. There has been no further bleeding. His pain has been well controlled.    -No external nasal deformities. No saddling. Stents removed- healing yandel-transfixion incision w/ mild crusting. No septal perforations or hematomas. Well-lateralized IT's. A/P:   Diagnosis Orders   1. S/P nasal septoplasty          Doing great now 1 week out from his septoplasty. I removed the stents and everything is healing up well. He will continue using saline sprays and apply Vaseline around both nares to help with hydration. RTC in 1 month for another check-up.     Anette Perez MD

## 2023-06-05 ENCOUNTER — OFFICE VISIT (OUTPATIENT)
Dept: ENT CLINIC | Age: 23
End: 2023-06-05

## 2023-06-05 VITALS — WEIGHT: 168 LBS | HEIGHT: 69 IN | BODY MASS INDEX: 24.88 KG/M2

## 2023-06-05 DIAGNOSIS — Z98.890 S/P NASAL SEPTOPLASTY: Primary | ICD-10-CM

## 2023-06-05 PROCEDURE — 99024 POSTOP FOLLOW-UP VISIT: CPT | Performed by: OTOLARYNGOLOGY

## 2023-06-05 ASSESSMENT — ENCOUNTER SYMPTOMS
DIARRHEA: 0
EYE PAIN: 0
COUGH: 0
COLOR CHANGE: 0
WHEEZING: 0
VOMITING: 0

## 2023-06-05 NOTE — PROGRESS NOTES
Chief Complaint   Patient presents with    Follow-up     Patient was elbowed in the nose last Thursday June 1, and is here to make sure everything was healing corrected. HPI:  Faiza Grant is a 23 y.o. male seen in follow-up now almost 2 weeks out after undergoing septoplasty back on 5/25/2023. I had removed his stents on 5/31/2023. He called at the end of last week as he had an elbow hit his nose and he reported a moderate amount of bleeding afterwards. The bleeding has since stopped but he still has some pain and feels a little more obstructed on the left side since this injury. Past Medical History, Past Surgical History, Family history, Social History, and Medications were all reviewed with the patient today and updated as necessary. No Known Allergies  Patient Active Problem List   Diagnosis    Osteoarthritis of multiple joints    Pure hypercholesterolemia    Elevated AST (SGOT)    Deviated septum    Chronic rhinitis     Current Outpatient Medications   Medication Sig    cephALEXin (KEFLEX) 500 MG capsule Take 1 capsule by mouth 4 times daily    ondansetron (ZOFRAN-ODT) 4 MG disintegrating tablet Take 1 tablet by mouth 3 times daily as needed for Nausea or Vomiting    montelukast (SINGULAIR) 10 MG tablet Take 1 tablet by mouth daily     No current facility-administered medications for this visit.      Past Medical History:   Diagnosis Date    Deviated septum     Elbow tendinitis     Foot fracture, right     5th metatarsal at age 13, stress fracture from cross country running    Nasal obstruction     [facility]    Nasal septal deviation 02/01/2023    [facility]    Osteoarthritis      Social History     Tobacco Use    Smoking status: Never    Smokeless tobacco: Never   Substance Use Topics    Alcohol use: Never     Past Surgical History:   Procedure Laterality Date    NASAL SEPTOPLASTY W/ TURBINOPLASTY  05/25/2023    [facility]     Family History   Problem Relation Age of Onset    High Blood

## 2023-07-10 ENCOUNTER — OFFICE VISIT (OUTPATIENT)
Dept: ENT CLINIC | Age: 23
End: 2023-07-10

## 2023-07-10 DIAGNOSIS — Z98.890 S/P NASAL SEPTOPLASTY: Primary | ICD-10-CM

## 2023-07-10 PROCEDURE — 99024 POSTOP FOLLOW-UP VISIT: CPT | Performed by: OTOLARYNGOLOGY

## 2023-07-10 ASSESSMENT — ENCOUNTER SYMPTOMS
COLOR CHANGE: 0
DIARRHEA: 0
EYE PAIN: 0
VOMITING: 0
WHEEZING: 0
COUGH: 0

## 2023-07-10 NOTE — PROGRESS NOTES
Chief Complaint   Patient presents with    Follow-up     Patient is here his one month follow up on his septum and states that he is doing well. HPI:  Nina Vivas is a 23 y.o. male seen in follow-up now 6 weeks postop after undergoing septoplasty back on 5/25/2023. I had seen him back on 6/5/2023 after he had gotten hit in the nose, but his septum was healing well at that time. Today, he is doing great with excellent airflow bilaterally. He denies any further nasal pain, tenderness, epistaxis, or crusting. He is very happy with his surgical result. Past Medical History, Past Surgical History, Family history, Social History, and Medications were all reviewed with the patient today and updated as necessary. No Known Allergies  Patient Active Problem List   Diagnosis    Osteoarthritis of multiple joints    Pure hypercholesterolemia    Elevated AST (SGOT)    Deviated septum    Chronic rhinitis     Current Outpatient Medications   Medication Sig    cephALEXin (KEFLEX) 500 MG capsule Take 1 capsule by mouth 4 times daily    ondansetron (ZOFRAN-ODT) 4 MG disintegrating tablet Take 1 tablet by mouth 3 times daily as needed for Nausea or Vomiting    montelukast (SINGULAIR) 10 MG tablet Take 1 tablet by mouth daily     No current facility-administered medications for this visit.      Past Medical History:   Diagnosis Date    Deviated septum     Elbow tendinitis     Foot fracture, right     5th metatarsal at age 13, stress fracture from cross country running    Nasal obstruction     [facility]    Nasal septal deviation 02/01/2023    [facility]    Osteoarthritis      Social History     Tobacco Use    Smoking status: Never    Smokeless tobacco: Never   Substance Use Topics    Alcohol use: Never     Past Surgical History:   Procedure Laterality Date    NASAL SEPTOPLASTY W/ TURBINOPLASTY  05/25/2023    [facility]     Family History   Problem Relation Age of Onset    High Blood Pressure Father     Hearing Loss

## 2023-10-20 SDOH — ECONOMIC STABILITY: FOOD INSECURITY: WITHIN THE PAST 12 MONTHS, YOU WORRIED THAT YOUR FOOD WOULD RUN OUT BEFORE YOU GOT MONEY TO BUY MORE.: NEVER TRUE

## 2023-10-20 SDOH — ECONOMIC STABILITY: FOOD INSECURITY: WITHIN THE PAST 12 MONTHS, THE FOOD YOU BOUGHT JUST DIDN'T LAST AND YOU DIDN'T HAVE MONEY TO GET MORE.: NEVER TRUE

## 2023-10-20 SDOH — ECONOMIC STABILITY: HOUSING INSECURITY
IN THE LAST 12 MONTHS, WAS THERE A TIME WHEN YOU DID NOT HAVE A STEADY PLACE TO SLEEP OR SLEPT IN A SHELTER (INCLUDING NOW)?: NO

## 2023-10-20 SDOH — ECONOMIC STABILITY: INCOME INSECURITY: HOW HARD IS IT FOR YOU TO PAY FOR THE VERY BASICS LIKE FOOD, HOUSING, MEDICAL CARE, AND HEATING?: NOT HARD AT ALL

## 2023-10-20 SDOH — ECONOMIC STABILITY: TRANSPORTATION INSECURITY
IN THE PAST 12 MONTHS, HAS LACK OF TRANSPORTATION KEPT YOU FROM MEETINGS, WORK, OR FROM GETTING THINGS NEEDED FOR DAILY LIVING?: NO

## 2023-10-20 ASSESSMENT — PATIENT HEALTH QUESTIONNAIRE - PHQ9
1. LITTLE INTEREST OR PLEASURE IN DOING THINGS: NOT AT ALL
SUM OF ALL RESPONSES TO PHQ9 QUESTIONS 1 & 2: 0
SUM OF ALL RESPONSES TO PHQ QUESTIONS 1-9: 0
2. FEELING DOWN, DEPRESSED OR HOPELESS: 0
SUM OF ALL RESPONSES TO PHQ QUESTIONS 1-9: 0
SUM OF ALL RESPONSES TO PHQ QUESTIONS 1-9: 0
1. LITTLE INTEREST OR PLEASURE IN DOING THINGS: 0
SUM OF ALL RESPONSES TO PHQ QUESTIONS 1-9: 0
2. FEELING DOWN, DEPRESSED OR HOPELESS: NOT AT ALL
SUM OF ALL RESPONSES TO PHQ9 QUESTIONS 1 & 2: 0

## 2023-10-23 ENCOUNTER — OFFICE VISIT (OUTPATIENT)
Dept: INTERNAL MEDICINE CLINIC | Facility: CLINIC | Age: 23
End: 2023-10-23
Payer: COMMERCIAL

## 2023-10-23 VITALS
WEIGHT: 168.8 LBS | SYSTOLIC BLOOD PRESSURE: 131 MMHG | HEIGHT: 69 IN | BODY MASS INDEX: 25 KG/M2 | OXYGEN SATURATION: 98 % | DIASTOLIC BLOOD PRESSURE: 76 MMHG | HEART RATE: 56 BPM | TEMPERATURE: 98.5 F

## 2023-10-23 DIAGNOSIS — R21 RASH AND NONSPECIFIC SKIN ERUPTION: ICD-10-CM

## 2023-10-23 DIAGNOSIS — Z00.00 ROUTINE GENERAL MEDICAL EXAMINATION AT A HEALTH CARE FACILITY: Primary | ICD-10-CM

## 2023-10-23 PROCEDURE — 99395 PREV VISIT EST AGE 18-39: CPT | Performed by: NURSE PRACTITIONER

## 2023-10-23 RX ORDER — TRIAMCINOLONE ACETONIDE 5 MG/G
CREAM TOPICAL
Qty: 15 G | Refills: 5 | Status: SHIPPED | OUTPATIENT
Start: 2023-10-23

## 2023-10-23 ASSESSMENT — ENCOUNTER SYMPTOMS
EYE PAIN: 0
SHORTNESS OF BREATH: 0
CONSTIPATION: 0
RHINORRHEA: 0
NAUSEA: 0
SORE THROAT: 0
VOMITING: 0
SINUS PAIN: 0
DIARRHEA: 0
BACK PAIN: 0
ABDOMINAL PAIN: 0
COUGH: 0

## 2023-10-23 NOTE — PROGRESS NOTES
cream; Apply topically 2 times daily as needed    Body mass index (BMI) of 24.0-24.9 in adult         Cont to fu with ENT re: hx of deviated septum. Orders Placed This Encounter   Procedures    CBC with Auto Differential     Standing Status:   Future     Standing Expiration Date:   10/23/2024    Comprehensive Metabolic Panel     Standing Status:   Future     Standing Expiration Date:   11/23/2023    Lipid Panel     Standing Status:   Future     Standing Expiration Date:   1/23/2024     Order Specific Question:   Is Patient Fasting?/# of Hours     Answer:   Yes    TSH     Standing Status:   Future     Standing Expiration Date:   1/23/2024    Urinalysis     Standing Status:   Future     Standing Expiration Date:   11/23/2023                  Follow Up  Return in about 1 year (around 10/23/2024), or if symptoms worsen or fail to improve, for Physical with fasting labs.              Roderick Hernandez, BEATRIZ, FNP-BC

## 2024-10-21 ASSESSMENT — PATIENT HEALTH QUESTIONNAIRE - PHQ9
SUM OF ALL RESPONSES TO PHQ QUESTIONS 1-9: 0
2. FEELING DOWN, DEPRESSED OR HOPELESS: NOT AT ALL
SUM OF ALL RESPONSES TO PHQ QUESTIONS 1-9: 0
SUM OF ALL RESPONSES TO PHQ9 QUESTIONS 1 & 2: 0
SUM OF ALL RESPONSES TO PHQ9 QUESTIONS 1 & 2: 0
2. FEELING DOWN, DEPRESSED OR HOPELESS: NOT AT ALL
1. LITTLE INTEREST OR PLEASURE IN DOING THINGS: NOT AT ALL
SUM OF ALL RESPONSES TO PHQ QUESTIONS 1-9: 0
SUM OF ALL RESPONSES TO PHQ QUESTIONS 1-9: 0
1. LITTLE INTEREST OR PLEASURE IN DOING THINGS: NOT AT ALL

## 2024-10-22 SDOH — ECONOMIC STABILITY: FOOD INSECURITY: WITHIN THE PAST 12 MONTHS, YOU WORRIED THAT YOUR FOOD WOULD RUN OUT BEFORE YOU GOT MONEY TO BUY MORE.: NEVER TRUE

## 2024-10-22 SDOH — ECONOMIC STABILITY: FOOD INSECURITY: WITHIN THE PAST 12 MONTHS, THE FOOD YOU BOUGHT JUST DIDN'T LAST AND YOU DIDN'T HAVE MONEY TO GET MORE.: NEVER TRUE

## 2024-10-22 SDOH — ECONOMIC STABILITY: INCOME INSECURITY: HOW HARD IS IT FOR YOU TO PAY FOR THE VERY BASICS LIKE FOOD, HOUSING, MEDICAL CARE, AND HEATING?: NOT HARD AT ALL

## 2024-10-23 ENCOUNTER — OFFICE VISIT (OUTPATIENT)
Dept: INTERNAL MEDICINE CLINIC | Facility: CLINIC | Age: 24
End: 2024-10-23
Payer: COMMERCIAL

## 2024-10-23 VITALS
SYSTOLIC BLOOD PRESSURE: 124 MMHG | TEMPERATURE: 98.3 F | HEIGHT: 70 IN | HEART RATE: 64 BPM | OXYGEN SATURATION: 97 % | BODY MASS INDEX: 24.57 KG/M2 | WEIGHT: 171.6 LBS | DIASTOLIC BLOOD PRESSURE: 76 MMHG

## 2024-10-23 DIAGNOSIS — Z00.00 ROUTINE GENERAL MEDICAL EXAMINATION AT A HEALTH CARE FACILITY: Primary | ICD-10-CM

## 2024-10-23 DIAGNOSIS — E78.00 PURE HYPERCHOLESTEROLEMIA: ICD-10-CM

## 2024-10-23 DIAGNOSIS — G89.29 CHRONIC PAIN OF LEFT ANKLE: ICD-10-CM

## 2024-10-23 DIAGNOSIS — M25.572 CHRONIC PAIN OF LEFT ANKLE: ICD-10-CM

## 2024-10-23 LAB
ALBUMIN SERPL-MCNC: 4.2 G/DL (ref 3.5–5)
ALBUMIN/GLOB SERPL: 1.4 (ref 1–1.9)
ALP SERPL-CCNC: 55 U/L (ref 40–129)
ALT SERPL-CCNC: 15 U/L (ref 8–55)
ANION GAP SERPL CALC-SCNC: 9 MMOL/L (ref 9–18)
APPEARANCE UR: ABNORMAL
AST SERPL-CCNC: 27 U/L (ref 15–37)
BASOPHILS # BLD: 0 K/UL (ref 0–0.2)
BASOPHILS NFR BLD: 1 % (ref 0–2)
BILIRUB SERPL-MCNC: 0.2 MG/DL (ref 0–1.2)
BILIRUB UR QL: NEGATIVE
BUN SERPL-MCNC: 14 MG/DL (ref 6–23)
CALCIUM SERPL-MCNC: 9.6 MG/DL (ref 8.8–10.2)
CHLORIDE SERPL-SCNC: 102 MMOL/L (ref 98–107)
CHOLEST SERPL-MCNC: 193 MG/DL (ref 0–200)
CO2 SERPL-SCNC: 27 MMOL/L (ref 20–28)
COLOR UR: ABNORMAL
CREAT SERPL-MCNC: 0.96 MG/DL (ref 0.8–1.3)
DIFFERENTIAL METHOD BLD: NORMAL
EOSINOPHIL # BLD: 0.1 K/UL (ref 0–0.8)
EOSINOPHIL NFR BLD: 1 % (ref 0.5–7.8)
ERYTHROCYTE [DISTWIDTH] IN BLOOD BY AUTOMATED COUNT: 13.1 % (ref 11.9–14.6)
GLOBULIN SER CALC-MCNC: 3.1 G/DL (ref 2.3–3.5)
GLUCOSE SERPL-MCNC: 81 MG/DL (ref 70–99)
GLUCOSE UR STRIP.AUTO-MCNC: NEGATIVE MG/DL
HCT VFR BLD AUTO: 45.1 % (ref 41.1–50.3)
HDLC SERPL-MCNC: 46 MG/DL (ref 40–60)
HDLC SERPL: 4.2 (ref 0–5)
HGB BLD-MCNC: 14.5 G/DL (ref 13.6–17.2)
HGB UR QL STRIP: NEGATIVE
IMM GRANULOCYTES # BLD AUTO: 0 K/UL (ref 0–0.5)
IMM GRANULOCYTES NFR BLD AUTO: 0 % (ref 0–5)
KETONES UR QL STRIP.AUTO: NEGATIVE MG/DL
LDLC SERPL CALC-MCNC: 134 MG/DL (ref 0–100)
LEUKOCYTE ESTERASE UR QL STRIP.AUTO: NEGATIVE
LYMPHOCYTES # BLD: 1.8 K/UL (ref 0.5–4.6)
LYMPHOCYTES NFR BLD: 35 % (ref 13–44)
MCH RBC QN AUTO: 28.7 PG (ref 26.1–32.9)
MCHC RBC AUTO-ENTMCNC: 32.2 G/DL (ref 31.4–35)
MCV RBC AUTO: 89.3 FL (ref 82–102)
MONOCYTES # BLD: 0.4 K/UL (ref 0.1–1.3)
MONOCYTES NFR BLD: 8 % (ref 4–12)
NEUTS SEG # BLD: 2.8 K/UL (ref 1.7–8.2)
NEUTS SEG NFR BLD: 55 % (ref 43–78)
NITRITE UR QL STRIP.AUTO: NEGATIVE
NRBC # BLD: 0 K/UL (ref 0–0.2)
PH UR STRIP: 5.5 (ref 5–9)
PLATELET # BLD AUTO: 215 K/UL (ref 150–450)
PMV BLD AUTO: 11.6 FL (ref 9.4–12.3)
POTASSIUM SERPL-SCNC: 4.3 MMOL/L (ref 3.5–5.1)
PROT SERPL-MCNC: 7.3 G/DL (ref 6.3–8.2)
PROT UR STRIP-MCNC: NEGATIVE MG/DL
RBC # BLD AUTO: 5.05 M/UL (ref 4.23–5.6)
SODIUM SERPL-SCNC: 139 MMOL/L (ref 136–145)
SP GR UR REFRACTOMETRY: 1.03 (ref 1–1.02)
TRIGL SERPL-MCNC: 67 MG/DL (ref 0–150)
TSH, 3RD GENERATION: 2.46 UIU/ML (ref 0.27–4.2)
UROBILINOGEN UR QL STRIP.AUTO: 0.2 EU/DL (ref 0.2–1)
VLDLC SERPL CALC-MCNC: 13 MG/DL (ref 6–23)
WBC # BLD AUTO: 5.1 K/UL (ref 4.3–11.1)

## 2024-10-23 PROCEDURE — 99395 PREV VISIT EST AGE 18-39: CPT | Performed by: NURSE PRACTITIONER

## 2024-10-23 ASSESSMENT — ENCOUNTER SYMPTOMS
ABDOMINAL PAIN: 0
SORE THROAT: 0
NAUSEA: 0
SHORTNESS OF BREATH: 0
CONSTIPATION: 0
SINUS PAIN: 0
RHINORRHEA: 0
BACK PAIN: 0
COUGH: 0
VOMITING: 0
DIARRHEA: 0
EYE PAIN: 0

## 2024-10-23 NOTE — PROGRESS NOTES
[facility]  [address] [place] [address]  Tel# [phone]  Fax# [phone]     Trudi Chen DNP, FNP-BC  Family Nurse Practitioner            Date of Visit: 10/23/2024     Dale West (: 2000) is a 24 y.o. male  established patient, here for evaluation of the following chief complaint(s):    Chief Complaint   Patient presents with    Annual Exam     Patient states he is here for his yearly physical. Patient also c/o left ankle is \"tight.\"         Patient Care Team:  Trudi Chen APRN - CNP as PCP - General (Nurse Practitioner Family)  Trudi Chen APRN - CNP as PCP - Empaneled Provider         History of Present Illness          Physical  Presents here for physical with fasting labs.    Diet: veges, protein, drinks water, sodas a few times a day (Coke Zero), fast foods few times a week   (Big Mac at [employer]).       Left ankle strain  Has been noticing limitation when stretching ankle. Has slight pain at time.  Still frequently climbs, back packing, tennis and pickle ball.  Had frequent left ankle pain from playing tennis since elementary school.            HCM      Immunization History   Administered Date(s) Administered    COVID-19, MODERNA BLUE border, Primary or Immunocompromised, (age 12y+), IM, 100 mcg/0.5mL 2021    COVID-19, MODERNA Booster BLUE border, (age 18y+), IM, 50mcg/0.25mL 2021, 2021    Meningococcal ACWY, MENACTRA (MenACWY-D), (age 9m-55y), IM, 0.5mL 2017    Varicella, VARIVAX, (age 12m+), SC, 0.5mL 2016          Social History     Substance and Sexual Activity   Alcohol Use Never        Tobacco Use: Low Risk  (10/23/2024)    Patient History     Smoking Tobacco Use: Never     Smokeless Tobacco Use: Never     Passive Exposure: Not on file        Patient Active Problem List   Diagnosis    Osteoarthritis of multiple joints    Pure hypercholesterolemia    Elevated AST (SGOT)    Deviated septum    Chronic rhinitis    Rash and

## 2024-10-29 ENCOUNTER — HOSPITAL ENCOUNTER (OUTPATIENT)
Dept: PHYSICAL THERAPY | Age: 24
Setting detail: RECURRING SERIES
Discharge: HOME | End: 2024-11-01
Payer: COMMERCIAL

## 2024-10-29 DIAGNOSIS — M76.62 ACHILLES TENDINITIS, LEFT LEG: ICD-10-CM

## 2024-10-29 DIAGNOSIS — M25.672 STIFFNESS OF LEFT ANKLE, NOT ELSEWHERE CLASSIFIED: ICD-10-CM

## 2024-10-29 DIAGNOSIS — M25.572 PAIN IN LEFT ANKLE AND JOINTS OF LEFT FOOT: Primary | ICD-10-CM

## 2024-10-29 PROCEDURE — 97161 PT EVAL LOW COMPLEX 20 MIN: CPT

## 2024-10-29 PROCEDURE — 97110 THERAPEUTIC EXERCISES: CPT

## 2024-10-29 ASSESSMENT — PAIN DESCRIPTION - LOCATION: LOCATION: ANKLE

## 2024-10-29 ASSESSMENT — PAIN DESCRIPTION - PAIN TYPE: TYPE: CHRONIC PAIN

## 2024-10-29 ASSESSMENT — PAIN DESCRIPTION - ORIENTATION: ORIENTATION: LEFT

## 2024-10-29 ASSESSMENT — PAIN SCALES - GENERAL: PAINLEVEL_OUTOF10: 1

## 2024-10-29 NOTE — THERAPY EVALUATION
Dale West  : 2000  Primary: [employer] ([employer] MC[employer])  Secondary: [facility] [facility]  [address]  [address]  [facility] [address]  Phone: [phone]  Fax: [phone] Plan Frequency: 1 x week for 4 weeks  Plan of Care/Certification Expiration Date: 24        Plan of Care/Certification Expiration Date:  Plan of Care/Certification Expiration Date: 24    Frequency/Duration: Plan Frequency: 1 x week for 4 weeks      Time In/Out:   Time In: 1115  Time Out: 1200      PT Visit Info:    Total # of Visits to Date: 1      Visit Count:  1                OUTPATIENT PHYSICAL THERAPY:             Initial Assessment 10/29/2024               Episode (achilles tendonitis)         Treatment Diagnosis:     Pain in left ankle and joints of left foot  Stiffness of left ankle, not elsewhere classified  Achilles tendinitis, left leg  Medical/Referring Diagnosis:    Chronic pain of left ankle [M25.572, G89.29]    Referring Physician:  Trudi Chen, MARINA - CNP  MD Orders:  PT Eval and Treat   Return MD Appt:  1 year  Date of Onset:    chronic  Allergies:  Patient has no known allergies.  Restrictions/Precautions:    None      Medications Last Reviewed:  10/29/2024     SUBJECTIVE   History of Injury/Illness (Reason for Referral):  Dale West presents with complaints of chronic stiffness in posterior left ankle.  He reports mild range of motion limitations.  Pain is mainly when he is doing climbing training.  Patient Stated Goal(s):  \"Gain mobility in left ankle\"  Initial Pain Level:  Left Ankle 1/10   Post Session Pain Level: Left Ankle 0/10  Past Medical History/Comorbidities:   Mr. West  has a past medical history of Deviated septum, Elbow tendinitis, Foot fracture, right, Nasal obstruction, Nasal septal deviation, and Osteoarthritis.  Mr. West  has a past surgical history that includes Nasal septoplasty w/ turbinoplasty (2023).  Social History/Living Environment:

## 2024-10-30 NOTE — PROGRESS NOTES
Daleben West  : 2000  Primary: [employer] ([employer]a MC[employer])  Secondary: [employer]  [address]  [address]  [facility] [address]  Phone: [phone]  Fax: [phone] Plan Frequency: 1 x week for 4 weeks    Plan of Care/Certification Expiration Date: 24        Plan of Care/Certification Expiration Date:  Plan of Care/Certification Expiration Date: 24    Frequency/Duration:   Plan Frequency: 1 x week for 4 weeks      Time In/Out:   Time In: 1115  Time Out: 1200      PT Visit Info:    Total # of Visits to Date: 1      Visit Count:  1    OUTPATIENT PHYSICAL THERAPY:   Treatment Note 10/29/2024       Episode  (achilles tendonitis)               Treatment Diagnosis:    Pain in left ankle and joints of left foot  Stiffness of left ankle, not elsewhere classified  Achilles tendinitis, left leg  Medical/Referring Diagnosis:    Chronic pain of left ankle [M25.572, G89.29]    Referring Physician:  Trudi Chen, APRN - CNP  MD Orders:  PT Eval and Treat   Return MD Appt:  1 year   Date of Onset:  chronic  Allergies:   Patient has no known allergies.  Restrictions/Precautions:   None      Interventions Planned (Treatment may consist of any combination of the following):     See Assessment Note    Subjective Comments:   Reports chronic tightness in posterior left ankle  Initial Pain Level:: Left Ankle 1/10  Post Session Pain Level:  Left  Ankle 0/10  Medications Last Reviewed:  10/29/2024  Updated Objective Findings:  See Evaluation Note from today  Treatment   THERAPEUTIC EXERCISE: (15 minutes):    Exercises per grid below to improve mobility, strength, and coordination.  Required minimal verbal cues to promote proper body alignment and promote proper body mechanics.  Progressed resistance, range, repetitions, and complexity of movement as indicated.   Date:  10-29-24 Date:   Date:     Activity/Exercise Parameters Parameters Parameters   HEP      TB ankle 4 way BTB x 10 each

## 2025-02-12 ENCOUNTER — OFFICE VISIT (OUTPATIENT)
Dept: INTERNAL MEDICINE CLINIC | Facility: CLINIC | Age: 25
End: 2025-02-12
Payer: COMMERCIAL

## 2025-02-12 VITALS
WEIGHT: 171.7 LBS | BODY MASS INDEX: 24.58 KG/M2 | HEART RATE: 64 BPM | SYSTOLIC BLOOD PRESSURE: 121 MMHG | DIASTOLIC BLOOD PRESSURE: 69 MMHG | HEIGHT: 70 IN | OXYGEN SATURATION: 97 % | TEMPERATURE: 97.9 F

## 2025-02-12 DIAGNOSIS — R20.8 OTHER DISTURBANCES OF SKIN SENSATION: ICD-10-CM

## 2025-02-12 DIAGNOSIS — M25.531 RIGHT WRIST PAIN: Primary | ICD-10-CM

## 2025-02-12 PROCEDURE — 99213 OFFICE O/P EST LOW 20 MIN: CPT | Performed by: NURSE PRACTITIONER

## 2025-02-12 ASSESSMENT — ENCOUNTER SYMPTOMS
BACK PAIN: 0
DIARRHEA: 0
VOMITING: 0
SORE THROAT: 0
SINUS PAIN: 0
COUGH: 0
EYE PAIN: 0
CONSTIPATION: 0
ABDOMINAL PAIN: 0
NAUSEA: 0
RHINORRHEA: 0
SHORTNESS OF BREATH: 0

## 2025-02-12 NOTE — PROGRESS NOTES
Neurology     Number of Visits Requested:   1                  Follow Up  Return in about 8 months (around 10/24/2025), or if symptoms worsen or fail to improve, for Physical with fasting labs.             Trudi Chen, BEATRIZ, FNP-BC

## 2025-02-12 NOTE — PATIENT INSTRUCTIONS
Welcome our practice and to our [facility] family.  Thank you for choosing us as your health care provider.  In the coming days, you may receive a survey about your visit via text or email.  Please take a few minutes to answer these questions.   As our patient, we value you opinion and appreciate your feedback   about your [facility] experience.      Thank you    [facility]  [phone]  [employer]         Please arrive 20 minutes prior to your appointment time to allow time for checking in at the  and rooming with the medical assistant. Please bring your medication bottles at each visit.

## 2025-02-14 ENCOUNTER — OFFICE VISIT (OUTPATIENT)
Age: 25
End: 2025-02-14
Payer: COMMERCIAL

## 2025-02-14 DIAGNOSIS — R20.0 NUMBNESS AND TINGLING IN BOTH HANDS: Primary | ICD-10-CM

## 2025-02-14 DIAGNOSIS — R20.2 NUMBNESS AND TINGLING IN BOTH HANDS: Primary | ICD-10-CM

## 2025-02-14 PROCEDURE — 99204 OFFICE O/P NEW MOD 45 MIN: CPT | Performed by: NURSE PRACTITIONER

## 2025-02-14 RX ORDER — MELOXICAM 15 MG/1
15 TABLET ORAL DAILY
Qty: 30 TABLET | Refills: 0 | Status: SHIPPED | OUTPATIENT
Start: 2025-02-14 | End: 2025-03-16

## 2025-02-14 NOTE — PROGRESS NOTES
Orthopaedic Hand Surgery Note    Name: Dale West  YOB: 2000  Gender: male  MRN: [id number]    CC: New patient referred for right wrist/hand pain, numbness, and tingling    HPI: Patient is a 24 y.o. male with a chief complaint of right hand numbness and tingling in the median nerve distribution. The symptoms have been going on for over 6 months. The patient does not complain of night wakening or increased symptoms with driving. Evaluation to date has included PCP and x-rays. Treatment to date has included none.  The patient is quite active and enjoys mountain biking and tennis.  He works on a computer.  He states his symptoms are worse when doing these activities.  Patient denies diabetes.    ROS/Meds/PSH/PMH/FH/SH: I personally reviewed the patients standard intake form.  Pertinents are discussed In the HPI    Physical Examination:    Musculoskeletal:   Cervical spine has normal range of motion without tenderness to palpation, negative Spurling's test. Shoulders and elbows have normal pain free range of motion.    Examination of the right upper extremity demonstrates Normal sensation to light touch in the median distribution, normal sensation in ulnar and radial distribution, positive carpal tunnel compression testing and Phalen testing, cap refill < 5 seconds in all fingers. Inspection reveals no thenar atrophy.  Positive Tinel and elbow flexion compression test of the cubital tunnel, negative Tinel over Guyon's canal. Sensation to light touch in the ulnar 2 digits is normal with no intrinsic atrophy/weakness. No tenderness to palpation or masses noted in the forearm.    Imaging / Electrodiagnostic Tests:     X-rays include a 3 view right wrist are independently reviewed and interpreted.  No abnormality noted.    Assessment:     ICD-10-CM    1. Numbness and tingling in both hands  R20.0 Ambulatory referral to Neurology    R20.2           Plan:  We discussed the diagnosis and different

## 2025-02-20 ENCOUNTER — OFFICE VISIT (OUTPATIENT)
Dept: ENT CLINIC | Age: 25
End: 2025-02-20
Payer: COMMERCIAL

## 2025-02-20 VITALS
BODY MASS INDEX: 25.43 KG/M2 | SYSTOLIC BLOOD PRESSURE: 124 MMHG | HEIGHT: 70 IN | WEIGHT: 177.6 LBS | DIASTOLIC BLOOD PRESSURE: 76 MMHG

## 2025-02-20 DIAGNOSIS — J34.3 HYPERTROPHY OF INFERIOR NASAL TURBINATE: Chronic | ICD-10-CM

## 2025-02-20 DIAGNOSIS — J31.0 CHRONIC RHINITIS: Primary | Chronic | ICD-10-CM

## 2025-02-20 PROCEDURE — 99214 OFFICE O/P EST MOD 30 MIN: CPT | Performed by: PHYSICIAN ASSISTANT

## 2025-02-20 PROCEDURE — 31231 NASAL ENDOSCOPY DX: CPT | Performed by: PHYSICIAN ASSISTANT

## 2025-02-20 RX ORDER — AZELASTINE 1 MG/ML
2 SPRAY, METERED NASAL 2 TIMES DAILY
Qty: 120 ML | Refills: 1 | Status: SHIPPED | OUTPATIENT
Start: 2025-02-20

## 2025-02-20 ASSESSMENT — ENCOUNTER SYMPTOMS
SINUS PAIN: 0
EYES NEGATIVE: 1
SINUS PRESSURE: 1
ALLERGIC/IMMUNOLOGIC NEGATIVE: 1
RESPIRATORY NEGATIVE: 1
GASTROINTESTINAL NEGATIVE: 1

## 2025-02-20 NOTE — PROGRESS NOTES
History of Present Illness  The patient is a 24-year-old male presenting with sinus congestion.    He had influenza about 3 weeks ago, and the symptoms have not resolved. He reports significant congestion and drainage. He experiences a sensation of phlegm accumulation on his vocal cords, accompanied by a mild headache that is alleviated upon coughing. He occasionally uses a rinse for relief. He expresses concern about the potential need for antibiotics every time he contracts a cold. Approximately 1 to 2 months ago, he used Afrin during a severe cold episode. He has a history of nasal fracture, which was followed by recurrent illnesses due to residual congestion. Despite undergoing sinus surgery and septoplasty with Dr. Foss on 05/25/2023, these issues persist. He has been tested positive for allergies to mold and dust mites but reports no noticeable reactions.    ALLERGIES  The patient has tested positive for allergies to MOLD and DUST MITES.    MEDICATIONS  Past: Afrin        Chief Complaint   Patient presents with    Sinus Problem     Sinus drainage/congestion.  States that he had the flu about 3 weeks ago and they sx are not going away.  States that he feels like the flu turned into a sinus infection.  Headache present.  States that he is not having discoloration.         Patient Active Problem List   Diagnosis    Osteoarthritis of multiple joints    Pure hypercholesterolemia    Elevated AST (SGOT)    Deviated septum    Chronic rhinitis    Rash and nonspecific skin eruption    Numbness and tingling in both hands        Reviewed and updated this visit by provider:  Tobacco  Allergies  Meds  Problems  Med Hx  Surg Hx  Fam Hx         Review of Systems   Constitutional: Negative.    HENT:  Positive for congestion and sinus pressure. Negative for sinus pain.    Eyes: Negative.    Respiratory: Negative.     Cardiovascular: Negative.    Gastrointestinal: Negative.    Endocrine: Negative.    Genitourinary:

## 2025-02-24 ENCOUNTER — PROCEDURE VISIT (OUTPATIENT)
Dept: NEUROLOGY | Age: 25
End: 2025-02-24
Payer: COMMERCIAL

## 2025-02-24 VITALS — OXYGEN SATURATION: 98 % | HEIGHT: 70 IN | WEIGHT: 177 LBS | BODY MASS INDEX: 25.34 KG/M2 | HEART RATE: 67 BPM

## 2025-02-24 DIAGNOSIS — R20.2 NUMBNESS AND TINGLING IN BOTH HANDS: Primary | ICD-10-CM

## 2025-02-24 DIAGNOSIS — R20.0 NUMBNESS AND TINGLING IN BOTH HANDS: Primary | ICD-10-CM

## 2025-02-24 PROCEDURE — 95885 MUSC TST DONE W/NERV TST LIM: CPT | Performed by: PSYCHIATRY & NEUROLOGY

## 2025-02-24 PROCEDURE — 95913 NRV CNDJ TEST 13/> STUDIES: CPT | Performed by: PSYCHIATRY & NEUROLOGY

## 2025-02-24 NOTE — PROGRESS NOTES
EMG/Nerve Conduction Study Procedure Note  [address] [place] [address]   [phone]      Hx:    Exam:     24 y.o. w  male     EMG::    Upper.  No atrophy.  Tinel's sign is minimally positive at the wrist.  No Rhonda.  No Dolores.  No Spurling or Lhermitte sign.  Referring:    Dr. Bright RAY      Technologist ::   Dimitry Feldman  Hgt:   5 foot 10 inch           Summary    needle EMG upper with CV studies.                  Controlled environmental factors / EMG lab.  Temperature.   NCV : sensory segments:       Abnormal = slowed bilateral median SCV.  Normal bilateral ulnar bilateral radial SCV.     NCV transcarpal sensory segments:    Abnormal bilateral transcarpal median slowing with peak difference of latency on the right at 1.08 ms (UL = 0.20 ms); left side 0.67 ms.      NCV Motor MCV segments:     Normal bilateral median bilateral ulnar MCV.        F-wave studies:         Borderline prolonged bilateral median F waves with normal ulnar F waves.       NEEDLE EMG:   Tested muscles::   Normal tested bilateral APB muscles.                 INTERPRETATION:    ELECTROPHYSIOLOGIC EVIDENCE OF MILD BILATERAL ENTRAPMENT OF THE MEDIAN NERVE AT THE WRIST.  NO AXONAL DENERVATION.             CONCLUSION:      Mild bilateral carpal tunnel syndrome.              Procedure Details:       Correlates with examination and history.    Patient made fully aware.              Please Note::     Data and waveforms * filed under Procedure.    See Procedure Files for data pages.       [ *NOTE:  parts or all of this report are produced using artificial voice recognition software.  Some speech errors are inherent in such software and may be included in the produced record. ]           Brent Vásquez MD  Consultative  Neurodiagnostics   [facility]    [address] [id number]  Phone:  [phone]  Fax:   [phone]          + + +   Glossary:   MUP:

## 2025-03-07 ENCOUNTER — OFFICE VISIT (OUTPATIENT)
Age: 25
End: 2025-03-07

## 2025-03-07 DIAGNOSIS — M65.90 TENOSYNOVITIS: ICD-10-CM

## 2025-03-07 DIAGNOSIS — R20.0 NUMBNESS AND TINGLING IN BOTH HANDS: Primary | ICD-10-CM

## 2025-03-07 DIAGNOSIS — M25.50 MULTIPLE JOINT PAIN: ICD-10-CM

## 2025-03-07 DIAGNOSIS — G56.03 BILATERAL CARPAL TUNNEL SYNDROME: ICD-10-CM

## 2025-03-07 DIAGNOSIS — R20.2 NUMBNESS AND TINGLING IN BOTH HANDS: Primary | ICD-10-CM

## 2025-03-07 DIAGNOSIS — M19.90 INFLAMMATORY ARTHROPATHY: ICD-10-CM

## 2025-03-07 LAB
ALBUMIN SERPL-MCNC: 4.3 G/DL (ref 3.5–5)
ALBUMIN/GLOB SERPL: 1.4 (ref 1–1.9)
ALP SERPL-CCNC: 58 U/L (ref 40–129)
ALT SERPL-CCNC: 27 U/L (ref 8–55)
ANION GAP SERPL CALC-SCNC: 9 MMOL/L (ref 7–16)
AST SERPL-CCNC: 30 U/L (ref 15–37)
BASOPHILS # BLD: 0.04 K/UL (ref 0–0.2)
BASOPHILS NFR BLD: 0.8 % (ref 0–2)
BILIRUB SERPL-MCNC: 0.3 MG/DL (ref 0–1.2)
BUN SERPL-MCNC: 21 MG/DL (ref 6–23)
CALCIUM SERPL-MCNC: 9.5 MG/DL (ref 8.8–10.2)
CHLORIDE SERPL-SCNC: 102 MMOL/L (ref 98–107)
CO2 SERPL-SCNC: 28 MMOL/L (ref 20–29)
CREAT SERPL-MCNC: 0.87 MG/DL (ref 0.8–1.3)
CRP SERPL-MCNC: <0.3 MG/DL (ref 0–0.4)
DIFFERENTIAL METHOD BLD: NORMAL
EOSINOPHIL # BLD: 0.11 K/UL (ref 0–0.8)
EOSINOPHIL NFR BLD: 2.2 % (ref 0.5–7.8)
ERYTHROCYTE [DISTWIDTH] IN BLOOD BY AUTOMATED COUNT: 13.1 % (ref 11.9–14.6)
ERYTHROCYTE [SEDIMENTATION RATE] IN BLOOD: <1 MM/HR (ref 0–20)
GLOBULIN SER CALC-MCNC: 3.1 G/DL (ref 2.3–3.5)
GLUCOSE SERPL-MCNC: 86 MG/DL (ref 70–99)
HCT VFR BLD AUTO: 43.7 % (ref 41.1–50.3)
HGB BLD-MCNC: 14.1 G/DL (ref 13.6–17.2)
IMM GRANULOCYTES # BLD AUTO: 0.01 K/UL (ref 0–0.5)
IMM GRANULOCYTES NFR BLD AUTO: 0.2 % (ref 0–5)
LYMPHOCYTES # BLD: 2 K/UL (ref 0.5–4.6)
LYMPHOCYTES NFR BLD: 39.2 % (ref 13–44)
MCH RBC QN AUTO: 28.7 PG (ref 26.1–32.9)
MCHC RBC AUTO-ENTMCNC: 32.3 G/DL (ref 31.4–35)
MCV RBC AUTO: 88.8 FL (ref 82–102)
MONOCYTES # BLD: 0.39 K/UL (ref 0.1–1.3)
MONOCYTES NFR BLD: 7.6 % (ref 4–12)
NEUTS SEG # BLD: 2.55 K/UL (ref 1.7–8.2)
NEUTS SEG NFR BLD: 50 % (ref 43–78)
NRBC # BLD: 0 K/UL (ref 0–0.2)
PLATELET # BLD AUTO: 180 K/UL (ref 150–450)
PMV BLD AUTO: 11.2 FL (ref 9.4–12.3)
POTASSIUM SERPL-SCNC: 4.3 MMOL/L (ref 3.5–5.1)
PROT SERPL-MCNC: 7.4 G/DL (ref 6.3–8.2)
RBC # BLD AUTO: 4.92 M/UL (ref 4.23–5.6)
SODIUM SERPL-SCNC: 138 MMOL/L (ref 136–145)
URATE SERPL-MCNC: 4.8 MG/DL (ref 3.9–8.2)
WBC # BLD AUTO: 5.1 K/UL (ref 4.3–11.1)

## 2025-03-07 RX ORDER — METHYLPREDNISOLONE ACETATE 40 MG/ML
40 INJECTION, SUSPENSION INTRA-ARTICULAR; INTRALESIONAL; INTRAMUSCULAR; SOFT TISSUE ONCE
Status: COMPLETED | OUTPATIENT
Start: 2025-03-07 | End: 2025-03-07

## 2025-03-07 RX ADMIN — METHYLPREDNISOLONE ACETATE 40 MG: 40 INJECTION, SUSPENSION INTRA-ARTICULAR; INTRALESIONAL; INTRAMUSCULAR; SOFT TISSUE at 09:56

## 2025-03-07 NOTE — PROGRESS NOTES
Orthopaedic Hand Surgery Note    Name: Dale West  YOB: 2000  Gender: male  MRN: [id number]    CC: Follow up of bilateral hand pain, numbness, and tingling    HPI: Patient is a 24 y.o. male who is here regarding follow up for bilateral hand pain, numbness and tingling.  Patient is here to discuss his nerve conduction studies.  He reports that his right wrist feels somewhat \"unstable\" with certain motions.  He has had no change in the numbness and tingling.  Upon further questioning his father has gout.  He says there is a history of arthritis but he is unsure of what kind.  He reports multiple joint pain.  He reports stiffness in the morning.    2/14/25 Patient is a 24 y.o. male with a chief complaint of right hand numbness and tingling in the median nerve distribution. The symptoms have been going on for over 6 months. The patient does not complain of night wakening or increased symptoms with driving. Evaluation to date has included PCP and x-rays. Treatment to date has included none.  The patient is quite active and enjoys mountain biking and tennis.  He works on a computer.  He states his symptoms are worse when doing these activities.  Patient denies diabetes.     ROS/Meds/PSH/PMH/FH/SH: I personally reviewed the patients standard intake form.  Pertinents are discussed in the HPI    Physical Examination:  Musculoskeletal:   Cervical spine has normal range of motion without tenderness to palpation, negative Spurling's test. Shoulders and elbows have normal pain free range of motion.    Examination of the bilateral upper extremity demonstrates Decreased sensation to light touch in the median distribution, normal sensation in ulnar and radial distribution, Positive carpal tunnel compression testing and Phalen testing, cap refill < 5 seconds in all fingers. Inspection reveals no thenar atrophy. Negative Tinel and elbow flexion compression test of the cubital tunnel, negative Tinel over Guyon's

## 2025-03-09 LAB — ANA SER QL: NEGATIVE

## 2025-03-10 LAB — RHEUMATOID FACT SER QL LA: NEGATIVE

## 2025-03-11 LAB — CCP IGA+IGG SERPL IA-ACNC: 7 UNITS (ref 0–19)

## 2025-03-13 LAB — HLA-B27 QL NAA+PROBE: NEGATIVE

## 2025-04-03 ENCOUNTER — OFFICE VISIT (OUTPATIENT)
Age: 25
End: 2025-04-03
Payer: COMMERCIAL

## 2025-04-03 DIAGNOSIS — R20.2 NUMBNESS AND TINGLING IN BOTH HANDS: Primary | ICD-10-CM

## 2025-04-03 DIAGNOSIS — G56.03 BILATERAL CARPAL TUNNEL SYNDROME: ICD-10-CM

## 2025-04-03 DIAGNOSIS — M25.50 MULTIPLE JOINT PAIN: ICD-10-CM

## 2025-04-03 DIAGNOSIS — R20.0 NUMBNESS AND TINGLING IN BOTH HANDS: Primary | ICD-10-CM

## 2025-04-03 PROCEDURE — 99213 OFFICE O/P EST LOW 20 MIN: CPT | Performed by: NURSE PRACTITIONER

## 2025-04-04 NOTE — PROGRESS NOTES
Orthopaedic Hand Clinic Note    Name: Dale West  YOB: 2000  Gender: male  MRN: [id number]      Follow up visit:   1. Numbness and tingling in both hands    2. Multiple joint pain    3. Bilateral carpal tunnel syndrome        HPI: Dale West is a 24 y.o. male who is following up for bilateral carpal tunnel syndrome and right wrist pain.  He comes in early for evaluation of bilateral forearm pain and bilateral elbow pain.  He has an upcoming trip to [place] where he is expected to do a lot of rockclimbing.  He wants to ensure that that is safe to do so.  He notes significant improvement after the bilateral carpal tunnel injections.  He said his numbness and tingling have resolved.  He said his right wrist still feels \"unstable\" at times.  He states he is rockclimbing 5-6 times per week for at least 2 hours per time.    3/7/25 Patient is a 24 y.o. male who is here regarding follow up for bilateral hand pain, numbness and tingling.  Patient is here to discuss his nerve conduction studies.  He reports that his right wrist feels somewhat \"unstable\" with certain motions.  He has had no change in the numbness and tingling.  Upon further questioning his father has gout.  He says there is a history of arthritis but he is unsure of what kind.  He reports multiple joint pain.  He reports stiffness in the morning.     2/14/25 Patient is a 24 y.o. male with a chief complaint of right hand numbness and tingling in the median nerve distribution. The symptoms have been going on for over 6 months. The patient does not complain of night wakening or increased symptoms with driving. Evaluation to date has included PCP and x-rays. Treatment to date has included none.  The patient is quite active and enjoys mountain biking and tennis.  He works on a computer.  He states his symptoms are worse when doing these activities.  Patient denies diabetes.     ROS/Meds/PSH/PMH/FH/SH: I personally reviewed the patients

## 2025-05-23 ENCOUNTER — OFFICE VISIT (OUTPATIENT)
Dept: ENT CLINIC | Age: 25
End: 2025-05-23
Payer: COMMERCIAL

## 2025-05-23 VITALS — WEIGHT: 173 LBS | HEIGHT: 70 IN | BODY MASS INDEX: 24.77 KG/M2 | RESPIRATION RATE: 10 BRPM

## 2025-05-23 DIAGNOSIS — J01.90 ACUTE SINUSITIS, RECURRENCE NOT SPECIFIED, UNSPECIFIED LOCATION: Primary | ICD-10-CM

## 2025-05-23 DIAGNOSIS — Z98.890 S/P NASAL SEPTOPLASTY: ICD-10-CM

## 2025-05-23 PROCEDURE — 99213 OFFICE O/P EST LOW 20 MIN: CPT | Performed by: OTOLARYNGOLOGY

## 2025-05-23 ASSESSMENT — ENCOUNTER SYMPTOMS
ALLERGIC/IMMUNOLOGIC NEGATIVE: 1
SINUS PRESSURE: 1
RESPIRATORY NEGATIVE: 1
SINUS PAIN: 1
EYES NEGATIVE: 1
GASTROINTESTINAL NEGATIVE: 1

## 2025-05-23 NOTE — PROGRESS NOTES
Soft, supple, no palpable neck masses. No palpable parotid or submandibular masses. No thyromegaly or palpable thyroid nodules.   Lymphatics: No palpable cervical LAD.  Resp: No audible stridor or wheezing.  CV: No murmurs, no JVD.  Extremities: No clubbing or cyanosis.      ASSESSMENT and PLAN      ICD-10-CM    1. Acute sinusitis, recurrence not specified, unspecified location  J01.90       2. S/P nasal septoplasty  Z98.890         I suspect that he has developed another sinus infection, and I started him on another course of Augmentin today.  He will continue using saline sprays to keep his nasal mucosa moist.  He will call me next week if his symptoms have not improved, and we could always consider a short course of oral steroids.    Espinoza Foss MD  5/24/2025    Electronically signed by Espinoza Foss MD on 5/24/2025 at 9:46 AM

## 2025-06-10 DIAGNOSIS — M25.531 RIGHT WRIST PAIN: Primary | ICD-10-CM

## 2025-06-18 ENCOUNTER — OFFICE VISIT (OUTPATIENT)
Age: 25
End: 2025-06-18
Payer: COMMERCIAL

## 2025-06-18 DIAGNOSIS — G56.03 BILATERAL CARPAL TUNNEL SYNDROME: Primary | ICD-10-CM

## 2025-06-18 DIAGNOSIS — M25.531 RIGHT WRIST PAIN: ICD-10-CM

## 2025-06-18 PROCEDURE — 99214 OFFICE O/P EST MOD 30 MIN: CPT | Performed by: NURSE PRACTITIONER

## 2025-06-18 RX ORDER — MELOXICAM 15 MG/1
15 TABLET ORAL DAILY
Qty: 90 TABLET | Refills: 0 | Status: SHIPPED | OUTPATIENT
Start: 2025-06-18 | End: 2025-09-16

## 2025-06-18 NOTE — PROGRESS NOTES
Treatment to date has included none.  The patient is quite active and enjoys mountain biking and tennis.  He works on a computer.  He states his symptoms are worse when doing these activities.  Patient denies diabetes.     ROS/Meds/PSH/PMH/FH/SH: I personally reviewed the patients standard intake form.  Pertinents are discussed in the HPI    Physical Examination:  General: Awake and alert.  HEENT: Normocephalic, atraumatic  CV/Pulm: Breathing even and unlabored  Skin: No obvious rashes noted.  Lymphatic: No obvious evidence of lymphedema or lymphadenopathy    Musculoskeletal Examination:  Examination on the right upper extremity demonstrates cap refill < 5 seconds in all fingers  Patient has no swelling to the right wrist.  He is mildly tender over the ECU.  No tenderness over the radiocarpal joint, thumb CMC, first dorsal compartment.  Patient has no discomfort with flexion, extension, pronation, supination.  He is able to make a full composite fist.  He denies paresthesia.  He has a negative Phalen negative Tinel test.    Dr. Novoa has examined the patient    Imaging / Electrodiagnostic Tests:     MRI of the right wrist is reviewed with Dr. Novoa    IMPRESSION:     1.  Mild extensor carpi ulnaris tendinosis with superimposed split tear that  reconstitutes distally. Mild tenosynovitis. No dislocation or subluxation.  Intact overlying subsheath.  2.  Mild overlying subcutaneous edema.  3.  Possible partial thickness tear of the proximal component of the  scapholunate ligament. Dorsal and volar components are intact and unremarkable.  4.  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 No bone marrow edema.           Assessment:       Plan:   We discussed the diagnosis and different treatment options. We discussed observation, therapy, antiinflammatory medications and other pertinent treatment modalities.    After discussing in detail [facility]

## 2025-08-12 ENCOUNTER — TELEPHONE (OUTPATIENT)
Age: 25
End: 2025-08-12

## 2025-08-12 DIAGNOSIS — G56.03 BILATERAL CARPAL TUNNEL SYNDROME: Primary | ICD-10-CM

## 2025-08-20 ENCOUNTER — OFFICE VISIT (OUTPATIENT)
Age: 25
End: 2025-08-20
Payer: COMMERCIAL

## 2025-08-20 DIAGNOSIS — G56.03 BILATERAL CARPAL TUNNEL SYNDROME: Primary | ICD-10-CM

## 2025-08-20 PROCEDURE — G8419 CALC BMI OUT NRM PARAM NOF/U: HCPCS | Performed by: NURSE PRACTITIONER

## 2025-08-20 PROCEDURE — 1036F TOBACCO NON-USER: CPT | Performed by: NURSE PRACTITIONER

## 2025-08-20 PROCEDURE — 99214 OFFICE O/P EST MOD 30 MIN: CPT | Performed by: NURSE PRACTITIONER

## 2025-08-20 PROCEDURE — G8427 DOCREV CUR MEDS BY ELIG CLIN: HCPCS | Performed by: NURSE PRACTITIONER

## 2025-08-27 DIAGNOSIS — G56.03 BILATERAL CARPAL TUNNEL SYNDROME: Primary | ICD-10-CM

## 2025-09-02 ENCOUNTER — OUTSIDE SERVICES (OUTPATIENT)
Age: 25
End: 2025-09-02
Payer: COMMERCIAL

## 2025-09-02 DIAGNOSIS — G56.03 BILATERAL CARPAL TUNNEL SYNDROME: Primary | ICD-10-CM

## 2025-09-02 PROCEDURE — 76998 US GUIDE INTRAOP: CPT | Performed by: ORTHOPAEDIC SURGERY

## 2025-09-02 PROCEDURE — 64721 CARPAL TUNNEL SURGERY: CPT | Performed by: ORTHOPAEDIC SURGERY

## 2025-09-02 RX ORDER — TRAMADOL HYDROCHLORIDE 50 MG/1
50 TABLET ORAL EVERY 6 HOURS PRN
Qty: 20 TABLET | Refills: 0 | Status: SHIPPED | OUTPATIENT
Start: 2025-09-02 | End: 2025-09-07